# Patient Record
Sex: FEMALE | Race: WHITE | NOT HISPANIC OR LATINO | Employment: OTHER | URBAN - METROPOLITAN AREA
[De-identification: names, ages, dates, MRNs, and addresses within clinical notes are randomized per-mention and may not be internally consistent; named-entity substitution may affect disease eponyms.]

---

## 2023-06-05 LAB
ANION GAP IN SER/PLAS: 14 MMOL/L (ref 10–20)
CALCIUM (MG/DL) IN SER/PLAS: 9.4 MG/DL (ref 8.6–10.3)
CARBON DIOXIDE, TOTAL (MMOL/L) IN SER/PLAS: 24 MMOL/L (ref 21–32)
CHLORIDE (MMOL/L) IN SER/PLAS: 103 MMOL/L (ref 98–107)
CREATININE (MG/DL) IN SER/PLAS: 1.3 MG/DL (ref 0.5–1.05)
CREATININE (MG/DL) IN URINE: 55.8 MG/DL (ref 20–320)
ESTIMATED AVERAGE GLUCOSE FOR HBA1C: 169 MG/DL
GFR FEMALE: 41 ML/MIN/1.73M2
GLUCOSE (MG/DL) IN SER/PLAS: 251 MG/DL (ref 74–99)
HEMOGLOBIN A1C/HEMOGLOBIN TOTAL IN BLOOD: 7.5 %
PARATHYRIN INTACT (PG/ML) IN SER/PLAS: NORMAL
PHOSPHATE (MG/DL) IN SER/PLAS: 4.3 MG/DL (ref 2.5–4.9)
POTASSIUM (MMOL/L) IN SER/PLAS: 4.1 MMOL/L (ref 3.5–5.3)
PROTEIN (MG/DL) IN URINE: 5 MG/DL (ref 5–24)
PROTEIN/CREATININE (MG/MG) IN URINE: 0.09 MG/MG CREAT (ref 0–0.17)
SODIUM (MMOL/L) IN SER/PLAS: 137 MMOL/L (ref 136–145)
UREA NITROGEN (MG/DL) IN SER/PLAS: 28 MG/DL (ref 6–23)

## 2023-09-14 LAB
ANION GAP IN SER/PLAS: 16 MMOL/L (ref 10–20)
CALCIUM (MG/DL) IN SER/PLAS: 10.1 MG/DL (ref 8.6–10.3)
CARBON DIOXIDE, TOTAL (MMOL/L) IN SER/PLAS: 24 MMOL/L (ref 21–32)
CHLORIDE (MMOL/L) IN SER/PLAS: 103 MMOL/L (ref 98–107)
CREATININE (MG/DL) IN SER/PLAS: 1.58 MG/DL (ref 0.5–1.05)
ESTIMATED AVERAGE GLUCOSE FOR HBA1C: 157 MG/DL
GFR FEMALE: 33 ML/MIN/1.73M2
GLUCOSE (MG/DL) IN SER/PLAS: 199 MG/DL (ref 74–99)
HEMOGLOBIN A1C/HEMOGLOBIN TOTAL IN BLOOD: 7.1 %
POTASSIUM (MMOL/L) IN SER/PLAS: 3.9 MMOL/L (ref 3.5–5.3)
SODIUM (MMOL/L) IN SER/PLAS: 139 MMOL/L (ref 136–145)
UREA NITROGEN (MG/DL) IN SER/PLAS: 32 MG/DL (ref 6–23)

## 2023-11-25 DIAGNOSIS — E78.5 HYPERLIPIDEMIA, UNSPECIFIED HYPERLIPIDEMIA TYPE: ICD-10-CM

## 2023-11-25 DIAGNOSIS — I10 HYPERTENSION, UNSPECIFIED TYPE: ICD-10-CM

## 2023-11-25 DIAGNOSIS — N18.30 TYPE 2 DIABETES MELLITUS WITH STAGE 3 CHRONIC KIDNEY DISEASE, WITHOUT LONG-TERM CURRENT USE OF INSULIN, UNSPECIFIED WHETHER STAGE 3A OR 3B CKD (MULTI): Primary | ICD-10-CM

## 2023-11-25 DIAGNOSIS — E11.22 TYPE 2 DIABETES MELLITUS WITH STAGE 3 CHRONIC KIDNEY DISEASE, WITHOUT LONG-TERM CURRENT USE OF INSULIN, UNSPECIFIED WHETHER STAGE 3A OR 3B CKD (MULTI): Primary | ICD-10-CM

## 2023-11-27 RX ORDER — METFORMIN HYDROCHLORIDE 500 MG/1
500 TABLET ORAL 2 TIMES DAILY
Qty: 180 TABLET | Refills: 0 | Status: SHIPPED | OUTPATIENT
Start: 2023-11-27 | End: 2024-03-27 | Stop reason: SDUPTHER

## 2023-11-27 RX ORDER — SIMVASTATIN 40 MG/1
40 TABLET, FILM COATED ORAL NIGHTLY
Qty: 90 TABLET | Refills: 0 | Status: SHIPPED | OUTPATIENT
Start: 2023-11-27 | End: 2024-03-12 | Stop reason: SDUPTHER

## 2023-11-27 RX ORDER — LISINOPRIL 2.5 MG/1
2.5 TABLET ORAL DAILY
Qty: 90 TABLET | Refills: 0 | Status: SHIPPED | OUTPATIENT
Start: 2023-11-27 | End: 2024-03-12 | Stop reason: SDUPTHER

## 2023-11-27 RX ORDER — ATENOLOL 25 MG/1
TABLET ORAL DAILY
Qty: 45 TABLET | Refills: 0 | Status: SHIPPED | OUTPATIENT
Start: 2023-11-27 | End: 2023-12-21 | Stop reason: SINTOL

## 2023-12-11 ENCOUNTER — LAB (OUTPATIENT)
Dept: LAB | Facility: LAB | Age: 83
End: 2023-12-11
Payer: MEDICARE

## 2023-12-11 DIAGNOSIS — E11.69 TYPE 2 DIABETES MELLITUS WITH OTHER SPECIFIED COMPLICATION (MULTI): ICD-10-CM

## 2023-12-11 DIAGNOSIS — N25.81 SECONDARY HYPERPARATHYROIDISM OF RENAL ORIGIN (MULTI): ICD-10-CM

## 2023-12-11 DIAGNOSIS — E78.5 HYPERLIPIDEMIA, UNSPECIFIED: Primary | ICD-10-CM

## 2023-12-11 LAB
ALBUMIN SERPL BCP-MCNC: 4.3 G/DL (ref 3.4–5)
ALP SERPL-CCNC: 102 U/L (ref 33–136)
ALT SERPL W P-5'-P-CCNC: 16 U/L (ref 7–45)
ANION GAP SERPL CALC-SCNC: 16 MMOL/L (ref 10–20)
AST SERPL W P-5'-P-CCNC: 18 U/L (ref 9–39)
BASOPHILS # BLD AUTO: 0.06 X10*3/UL (ref 0–0.1)
BASOPHILS NFR BLD AUTO: 0.8 %
BILIRUB SERPL-MCNC: 0.6 MG/DL (ref 0–1.2)
BUN SERPL-MCNC: 29 MG/DL (ref 6–23)
CALCIUM SERPL-MCNC: 9.8 MG/DL (ref 8.6–10.3)
CHLORIDE SERPL-SCNC: 102 MMOL/L (ref 98–107)
CHOLEST SERPL-MCNC: 157 MG/DL (ref 0–199)
CHOLESTEROL/HDL RATIO: 3.9
CO2 SERPL-SCNC: 25 MMOL/L (ref 21–32)
CREAT SERPL-MCNC: 1.65 MG/DL (ref 0.5–1.05)
CREAT UR-MCNC: 65.3 MG/DL (ref 20–320)
EOSINOPHIL # BLD AUTO: 0.35 X10*3/UL (ref 0–0.4)
EOSINOPHIL NFR BLD AUTO: 4.5 %
ERYTHROCYTE [DISTWIDTH] IN BLOOD BY AUTOMATED COUNT: 14.2 % (ref 11.5–14.5)
GFR SERPL CREATININE-BSD FRML MDRD: 31 ML/MIN/1.73M*2
GLUCOSE SERPL-MCNC: 175 MG/DL (ref 74–99)
HCT VFR BLD AUTO: 50.6 % (ref 36–46)
HDLC SERPL-MCNC: 40.5 MG/DL
HGB BLD-MCNC: 16.1 G/DL (ref 12–16)
IMM GRANULOCYTES # BLD AUTO: 0.02 X10*3/UL (ref 0–0.5)
IMM GRANULOCYTES NFR BLD AUTO: 0.3 % (ref 0–0.9)
LDLC SERPL CALC-MCNC: 72 MG/DL
LYMPHOCYTES # BLD AUTO: 3.21 X10*3/UL (ref 0.8–3)
LYMPHOCYTES NFR BLD AUTO: 41.5 %
MCH RBC QN AUTO: 31.6 PG (ref 26–34)
MCHC RBC AUTO-ENTMCNC: 31.8 G/DL (ref 32–36)
MCV RBC AUTO: 99 FL (ref 80–100)
MONOCYTES # BLD AUTO: 0.52 X10*3/UL (ref 0.05–0.8)
MONOCYTES NFR BLD AUTO: 6.7 %
NEUTROPHILS # BLD AUTO: 3.58 X10*3/UL (ref 1.6–5.5)
NEUTROPHILS NFR BLD AUTO: 46.2 %
NON HDL CHOLESTEROL: 117 MG/DL (ref 0–149)
NRBC BLD-RTO: 0 /100 WBCS (ref 0–0)
PLATELET # BLD AUTO: 231 X10*3/UL (ref 150–450)
POTASSIUM SERPL-SCNC: 4.4 MMOL/L (ref 3.5–5.3)
PROT SERPL-MCNC: 6.7 G/DL (ref 6.4–8.2)
PROT UR-ACNC: 7 MG/DL (ref 5–24)
PROT/CREAT UR: 0.11 MG/MG CREAT (ref 0–0.17)
RBC # BLD AUTO: 5.09 X10*6/UL (ref 4–5.2)
SODIUM SERPL-SCNC: 139 MMOL/L (ref 136–145)
TRIGL SERPL-MCNC: 225 MG/DL (ref 0–149)
VLDL: 45 MG/DL (ref 0–40)
WBC # BLD AUTO: 7.7 X10*3/UL (ref 4.4–11.3)

## 2023-12-11 PROCEDURE — 36415 COLL VENOUS BLD VENIPUNCTURE: CPT

## 2023-12-11 PROCEDURE — 82570 ASSAY OF URINE CREATININE: CPT

## 2023-12-11 PROCEDURE — 85025 COMPLETE CBC W/AUTO DIFF WBC: CPT

## 2023-12-11 PROCEDURE — 80061 LIPID PANEL: CPT

## 2023-12-11 PROCEDURE — 84156 ASSAY OF PROTEIN URINE: CPT

## 2023-12-11 PROCEDURE — 80053 COMPREHEN METABOLIC PANEL: CPT

## 2023-12-12 ENCOUNTER — APPOINTMENT (OUTPATIENT)
Dept: RADIOLOGY | Facility: HOSPITAL | Age: 83
End: 2023-12-12
Payer: MEDICARE

## 2023-12-12 ENCOUNTER — HOSPITAL ENCOUNTER (OUTPATIENT)
Facility: HOSPITAL | Age: 83
Setting detail: OBSERVATION
Discharge: HOME | End: 2023-12-14
Attending: EMERGENCY MEDICINE | Admitting: STUDENT IN AN ORGANIZED HEALTH CARE EDUCATION/TRAINING PROGRAM
Payer: MEDICARE

## 2023-12-12 ENCOUNTER — TELEPHONE (OUTPATIENT)
Dept: PRIMARY CARE | Facility: CLINIC | Age: 83
End: 2023-12-12
Payer: MEDICARE

## 2023-12-12 DIAGNOSIS — I24.9 ACS (ACUTE CORONARY SYNDROME) (MULTI): Primary | ICD-10-CM

## 2023-12-12 DIAGNOSIS — R42 DIZZINESS: ICD-10-CM

## 2023-12-12 DIAGNOSIS — R07.9 CHEST PAIN, UNSPECIFIED TYPE: ICD-10-CM

## 2023-12-12 DIAGNOSIS — R00.1 BRADYCARDIA: ICD-10-CM

## 2023-12-12 DIAGNOSIS — K30 INDIGESTION: ICD-10-CM

## 2023-12-12 PROBLEM — R07.89 OTHER CHEST PAIN: Status: ACTIVE | Noted: 2023-12-12

## 2023-12-12 PROBLEM — H40.9 GLAUCOMA, BILATERAL: Status: ACTIVE | Noted: 2023-12-12

## 2023-12-12 PROBLEM — N18.30 CKD STAGE 3 DUE TO TYPE 2 DIABETES MELLITUS (MULTI): Status: ACTIVE | Noted: 2023-12-12

## 2023-12-12 PROBLEM — E11.69 DIABETES MELLITUS TYPE 2 IN OBESE: Status: ACTIVE | Noted: 2023-12-12

## 2023-12-12 PROBLEM — M51.36 DEGENERATION OF INTERVERTEBRAL DISC OF LUMBAR REGION: Status: ACTIVE | Noted: 2023-12-12

## 2023-12-12 PROBLEM — E66.9 DIABETES MELLITUS TYPE 2 IN OBESE: Status: ACTIVE | Noted: 2023-12-12

## 2023-12-12 PROBLEM — N25.81 HYPERPARATHYROIDISM, SECONDARY (MULTI): Status: ACTIVE | Noted: 2023-12-12

## 2023-12-12 PROBLEM — M51.369 DEGENERATION OF INTERVERTEBRAL DISC OF LUMBAR REGION: Status: ACTIVE | Noted: 2023-12-12

## 2023-12-12 PROBLEM — M47.812 DEGENERATIVE ARTHRITIS OF CERVICAL SPINE: Status: ACTIVE | Noted: 2023-12-12

## 2023-12-12 PROBLEM — I87.309 CHRONIC PERIPHERAL VENOUS HYPERTENSION: Status: ACTIVE | Noted: 2023-12-12

## 2023-12-12 PROBLEM — G89.29 CHRONIC RIGHT SHOULDER PAIN: Status: ACTIVE | Noted: 2021-03-22

## 2023-12-12 PROBLEM — E55.9 VITAMIN D DEFICIENCY: Status: ACTIVE | Noted: 2023-12-12

## 2023-12-12 PROBLEM — M25.511 CHRONIC RIGHT SHOULDER PAIN: Status: ACTIVE | Noted: 2021-03-22

## 2023-12-12 PROBLEM — E11.22 CKD STAGE 3 DUE TO TYPE 2 DIABETES MELLITUS (MULTI): Status: ACTIVE | Noted: 2023-12-12

## 2023-12-12 PROBLEM — H26.9 CATARACTS, BOTH EYES: Status: ACTIVE | Noted: 2023-12-12

## 2023-12-12 LAB
ALBUMIN SERPL BCP-MCNC: 3.9 G/DL (ref 3.4–5)
ALP SERPL-CCNC: 84 U/L (ref 33–136)
ALT SERPL W P-5'-P-CCNC: 16 U/L (ref 7–45)
ANION GAP SERPL CALC-SCNC: 12 MMOL/L (ref 10–20)
AST SERPL W P-5'-P-CCNC: 15 U/L (ref 9–39)
BASOPHILS # BLD AUTO: 0.05 X10*3/UL (ref 0–0.1)
BASOPHILS NFR BLD AUTO: 0.5 %
BILIRUB SERPL-MCNC: 0.6 MG/DL (ref 0–1.2)
BNP SERPL-MCNC: 149 PG/ML (ref 0–99)
BUN SERPL-MCNC: 31 MG/DL (ref 6–23)
CALCIUM SERPL-MCNC: 9.3 MG/DL (ref 8.6–10.3)
CARDIAC TROPONIN I PNL SERPL HS: 3 NG/L (ref 0–13)
CARDIAC TROPONIN I PNL SERPL HS: 4 NG/L (ref 0–13)
CHLORIDE SERPL-SCNC: 105 MMOL/L (ref 98–107)
CO2 SERPL-SCNC: 26 MMOL/L (ref 21–32)
CREAT SERPL-MCNC: 1.51 MG/DL (ref 0.5–1.05)
EOSINOPHIL # BLD AUTO: 0.37 X10*3/UL (ref 0–0.4)
EOSINOPHIL NFR BLD AUTO: 4 %
ERYTHROCYTE [DISTWIDTH] IN BLOOD BY AUTOMATED COUNT: 16.2 % (ref 11.5–14.5)
GFR SERPL CREATININE-BSD FRML MDRD: 35 ML/MIN/1.73M*2
GLUCOSE SERPL-MCNC: 158 MG/DL (ref 74–99)
HCT VFR BLD AUTO: 46.1 % (ref 36–46)
HGB BLD-MCNC: 14.5 G/DL (ref 12–16)
IMM GRANULOCYTES # BLD AUTO: 0.03 X10*3/UL (ref 0–0.5)
IMM GRANULOCYTES NFR BLD AUTO: 0.3 % (ref 0–0.9)
LIPASE SERPL-CCNC: 53 U/L (ref 9–82)
LYMPHOCYTES # BLD AUTO: 3.81 X10*3/UL (ref 0.8–3)
LYMPHOCYTES NFR BLD AUTO: 41.6 %
MAGNESIUM SERPL-MCNC: 1.99 MG/DL (ref 1.6–2.4)
MCH RBC QN AUTO: 30.7 PG (ref 26–34)
MCHC RBC AUTO-ENTMCNC: 31.5 G/DL (ref 32–36)
MCV RBC AUTO: 98 FL (ref 80–100)
MONOCYTES # BLD AUTO: 0.74 X10*3/UL (ref 0.05–0.8)
MONOCYTES NFR BLD AUTO: 8.1 %
NEUTROPHILS # BLD AUTO: 4.15 X10*3/UL (ref 1.6–5.5)
NEUTROPHILS NFR BLD AUTO: 45.5 %
NRBC BLD-RTO: 0 /100 WBCS (ref 0–0)
PLATELET # BLD AUTO: 211 X10*3/UL (ref 150–450)
POTASSIUM SERPL-SCNC: 3.9 MMOL/L (ref 3.5–5.3)
PROT SERPL-MCNC: 6.4 G/DL (ref 6.4–8.2)
RBC # BLD AUTO: 4.72 X10*6/UL (ref 4–5.2)
SODIUM SERPL-SCNC: 139 MMOL/L (ref 136–145)
WBC # BLD AUTO: 9.2 X10*3/UL (ref 4.4–11.3)

## 2023-12-12 PROCEDURE — 99222 1ST HOSP IP/OBS MODERATE 55: CPT | Performed by: NURSE PRACTITIONER

## 2023-12-12 PROCEDURE — 99285 EMERGENCY DEPT VISIT HI MDM: CPT | Mod: 25 | Performed by: EMERGENCY MEDICINE

## 2023-12-12 PROCEDURE — C9113 INJ PANTOPRAZOLE SODIUM, VIA: HCPCS | Performed by: NURSE PRACTITIONER

## 2023-12-12 PROCEDURE — 85025 COMPLETE CBC W/AUTO DIFF WBC: CPT | Performed by: EMERGENCY MEDICINE

## 2023-12-12 PROCEDURE — 83880 ASSAY OF NATRIURETIC PEPTIDE: CPT | Performed by: EMERGENCY MEDICINE

## 2023-12-12 PROCEDURE — 2500000004 HC RX 250 GENERAL PHARMACY W/ HCPCS (ALT 636 FOR OP/ED): Mod: MUE | Performed by: NURSE PRACTITIONER

## 2023-12-12 PROCEDURE — 71045 X-RAY EXAM CHEST 1 VIEW: CPT

## 2023-12-12 PROCEDURE — 2500000001 HC RX 250 WO HCPCS SELF ADMINISTERED DRUGS (ALT 637 FOR MEDICARE OP): Performed by: NURSE PRACTITIONER

## 2023-12-12 PROCEDURE — 96375 TX/PRO/DX INJ NEW DRUG ADDON: CPT

## 2023-12-12 PROCEDURE — 71045 X-RAY EXAM CHEST 1 VIEW: CPT | Performed by: RADIOLOGY

## 2023-12-12 PROCEDURE — 94760 N-INVAS EAR/PLS OXIMETRY 1: CPT

## 2023-12-12 PROCEDURE — 99285 EMERGENCY DEPT VISIT HI MDM: CPT | Performed by: EMERGENCY MEDICINE

## 2023-12-12 PROCEDURE — 83735 ASSAY OF MAGNESIUM: CPT | Performed by: EMERGENCY MEDICINE

## 2023-12-12 PROCEDURE — 93010 ELECTROCARDIOGRAM REPORT: CPT | Performed by: INTERNAL MEDICINE

## 2023-12-12 PROCEDURE — 36415 COLL VENOUS BLD VENIPUNCTURE: CPT | Performed by: EMERGENCY MEDICINE

## 2023-12-12 PROCEDURE — 84484 ASSAY OF TROPONIN QUANT: CPT | Performed by: EMERGENCY MEDICINE

## 2023-12-12 PROCEDURE — 83690 ASSAY OF LIPASE: CPT | Performed by: EMERGENCY MEDICINE

## 2023-12-12 PROCEDURE — 80053 COMPREHEN METABOLIC PANEL: CPT | Performed by: EMERGENCY MEDICINE

## 2023-12-12 PROCEDURE — G0378 HOSPITAL OBSERVATION PER HR: HCPCS

## 2023-12-12 PROCEDURE — 93010 ELECTROCARDIOGRAM REPORT: CPT | Performed by: EMERGENCY MEDICINE

## 2023-12-12 PROCEDURE — 2500000004 HC RX 250 GENERAL PHARMACY W/ HCPCS (ALT 636 FOR OP/ED)

## 2023-12-12 PROCEDURE — 96374 THER/PROPH/DIAG INJ IV PUSH: CPT

## 2023-12-12 PROCEDURE — 96372 THER/PROPH/DIAG INJ SC/IM: CPT | Mod: 59 | Performed by: NURSE PRACTITIONER

## 2023-12-12 RX ORDER — ACETAMINOPHEN 650 MG/1
650 SUPPOSITORY RECTAL EVERY 4 HOURS PRN
Status: DISCONTINUED | OUTPATIENT
Start: 2023-12-12 | End: 2023-12-14 | Stop reason: HOSPADM

## 2023-12-12 RX ORDER — BUMETANIDE 1 MG/1
0.5 TABLET ORAL DAILY
Status: DISCONTINUED | OUTPATIENT
Start: 2023-12-13 | End: 2023-12-14 | Stop reason: HOSPADM

## 2023-12-12 RX ORDER — SIMVASTATIN 40 MG/1
40 TABLET, FILM COATED ORAL NIGHTLY
Status: DISCONTINUED | OUTPATIENT
Start: 2023-12-12 | End: 2023-12-14 | Stop reason: HOSPADM

## 2023-12-12 RX ORDER — TIMOLOL 5 MG/ML
1 SOLUTION/ DROPS OPHTHALMIC DAILY
Status: DISCONTINUED | OUTPATIENT
Start: 2023-12-13 | End: 2023-12-14 | Stop reason: ALTCHOICE

## 2023-12-12 RX ORDER — NITROGLYCERIN 0.4 MG/1
0.4 TABLET SUBLINGUAL ONCE
Status: DISCONTINUED | OUTPATIENT
Start: 2023-12-12 | End: 2023-12-14 | Stop reason: HOSPADM

## 2023-12-12 RX ORDER — DEXTROSE 50 % IN WATER (D50W) INTRAVENOUS SYRINGE
25
Status: DISCONTINUED | OUTPATIENT
Start: 2023-12-12 | End: 2023-12-14 | Stop reason: HOSPADM

## 2023-12-12 RX ORDER — DEXTROSE MONOHYDRATE 100 MG/ML
0.3 INJECTION, SOLUTION INTRAVENOUS ONCE AS NEEDED
Status: DISCONTINUED | OUTPATIENT
Start: 2023-12-12 | End: 2023-12-14 | Stop reason: HOSPADM

## 2023-12-12 RX ORDER — CALCITRIOL 0.25 UG/1
0.25 CAPSULE ORAL
COMMUNITY
Start: 2017-06-19 | End: 2024-03-27 | Stop reason: SDUPTHER

## 2023-12-12 RX ORDER — HEPARIN SODIUM 5000 [USP'U]/ML
5000 INJECTION, SOLUTION INTRAVENOUS; SUBCUTANEOUS EVERY 8 HOURS
Status: DISCONTINUED | OUTPATIENT
Start: 2023-12-12 | End: 2023-12-14 | Stop reason: HOSPADM

## 2023-12-12 RX ORDER — ACETAMINOPHEN 325 MG/1
650 TABLET ORAL EVERY 4 HOURS PRN
Status: DISCONTINUED | OUTPATIENT
Start: 2023-12-12 | End: 2023-12-14 | Stop reason: HOSPADM

## 2023-12-12 RX ORDER — LATANOPROST 50 UG/ML
1 SOLUTION/ DROPS OPHTHALMIC NIGHTLY
Status: DISCONTINUED | OUTPATIENT
Start: 2023-12-12 | End: 2023-12-14 | Stop reason: HOSPADM

## 2023-12-12 RX ORDER — ONDANSETRON 4 MG/1
4 TABLET, ORALLY DISINTEGRATING ORAL EVERY 8 HOURS PRN
Status: DISCONTINUED | OUTPATIENT
Start: 2023-12-12 | End: 2023-12-14 | Stop reason: HOSPADM

## 2023-12-12 RX ORDER — ALLOPURINOL 300 MG/1
300 TABLET ORAL DAILY
COMMUNITY
Start: 2021-09-23 | End: 2024-03-27 | Stop reason: SDUPTHER

## 2023-12-12 RX ORDER — ACETAMINOPHEN 160 MG/5ML
650 SOLUTION ORAL EVERY 4 HOURS PRN
Status: DISCONTINUED | OUTPATIENT
Start: 2023-12-12 | End: 2023-12-14 | Stop reason: HOSPADM

## 2023-12-12 RX ORDER — POLYETHYLENE GLYCOL 3350 17 G/17G
17 POWDER, FOR SOLUTION ORAL DAILY PRN
Status: DISCONTINUED | OUTPATIENT
Start: 2023-12-12 | End: 2023-12-14 | Stop reason: HOSPADM

## 2023-12-12 RX ORDER — CALCITRIOL 0.25 UG/1
0.25 CAPSULE ORAL DAILY
Status: DISCONTINUED | OUTPATIENT
Start: 2023-12-13 | End: 2023-12-14 | Stop reason: HOSPADM

## 2023-12-12 RX ORDER — PANTOPRAZOLE SODIUM 40 MG/10ML
40 INJECTION, POWDER, LYOPHILIZED, FOR SOLUTION INTRAVENOUS DAILY
Status: DISCONTINUED | OUTPATIENT
Start: 2023-12-12 | End: 2023-12-14 | Stop reason: HOSPADM

## 2023-12-12 RX ORDER — ONDANSETRON HYDROCHLORIDE 2 MG/ML
4 INJECTION, SOLUTION INTRAVENOUS ONCE
Status: COMPLETED | OUTPATIENT
Start: 2023-12-12 | End: 2023-12-12

## 2023-12-12 RX ORDER — LATANOPROST 50 UG/ML
1 SOLUTION/ DROPS OPHTHALMIC NIGHTLY
COMMUNITY
Start: 2021-09-05

## 2023-12-12 RX ORDER — ATENOLOL 25 MG/1
12.5 TABLET ORAL DAILY
Status: DISCONTINUED | OUTPATIENT
Start: 2023-12-13 | End: 2023-12-14

## 2023-12-12 RX ORDER — CALCIUM CARBONATE 200(500)MG
500 TABLET,CHEWABLE ORAL 2 TIMES DAILY
Status: DISCONTINUED | OUTPATIENT
Start: 2023-12-12 | End: 2023-12-14 | Stop reason: HOSPADM

## 2023-12-12 RX ORDER — TIMOLOL 5 MG/ML
1 SOLUTION/ DROPS OPHTHALMIC 2 TIMES DAILY
COMMUNITY

## 2023-12-12 RX ORDER — ONDANSETRON HYDROCHLORIDE 2 MG/ML
4 INJECTION, SOLUTION INTRAVENOUS EVERY 8 HOURS PRN
Status: DISCONTINUED | OUTPATIENT
Start: 2023-12-12 | End: 2023-12-14 | Stop reason: HOSPADM

## 2023-12-12 RX ORDER — LISINOPRIL 5 MG/1
2.5 TABLET ORAL DAILY
Status: DISCONTINUED | OUTPATIENT
Start: 2023-12-13 | End: 2023-12-14 | Stop reason: HOSPADM

## 2023-12-12 RX ORDER — BUMETANIDE 0.5 MG/1
0.5 TABLET ORAL DAILY
COMMUNITY
Start: 2016-09-20

## 2023-12-12 RX ORDER — VIT A/VIT C/VIT E/ZINC/COPPER 4296-226
1 CAPSULE ORAL EVERY 12 HOURS
COMMUNITY

## 2023-12-12 RX ORDER — ALLOPURINOL 300 MG/1
300 TABLET ORAL DAILY
Status: DISCONTINUED | OUTPATIENT
Start: 2023-12-13 | End: 2023-12-14 | Stop reason: HOSPADM

## 2023-12-12 RX ORDER — INSULIN LISPRO 100 [IU]/ML
0-10 INJECTION, SOLUTION INTRAVENOUS; SUBCUTANEOUS
Status: DISCONTINUED | OUTPATIENT
Start: 2023-12-13 | End: 2023-12-14 | Stop reason: HOSPADM

## 2023-12-12 RX ADMIN — ONDANSETRON 4 MG: 2 INJECTION INTRAMUSCULAR; INTRAVENOUS at 11:36

## 2023-12-12 RX ADMIN — SIMVASTATIN 40 MG: 40 TABLET, FILM COATED ORAL at 21:21

## 2023-12-12 RX ADMIN — CALCIUM CARBONATE (ANTACID) CHEW TAB 500 MG 500 MG: 500 CHEW TAB at 21:21

## 2023-12-12 RX ADMIN — PANTOPRAZOLE SODIUM 40 MG: 40 INJECTION, POWDER, FOR SOLUTION INTRAVENOUS at 21:21

## 2023-12-12 RX ADMIN — LATANOPROST 1 DROP: 50 SOLUTION/ DROPS OPHTHALMIC at 22:30

## 2023-12-12 RX ADMIN — SODIUM CHLORIDE, POTASSIUM CHLORIDE, SODIUM LACTATE AND CALCIUM CHLORIDE 1000 ML: 600; 310; 30; 20 INJECTION, SOLUTION INTRAVENOUS at 11:41

## 2023-12-12 RX ADMIN — HEPARIN SODIUM 5000 UNITS: 5000 INJECTION INTRAVENOUS; SUBCUTANEOUS at 21:22

## 2023-12-12 SDOH — SOCIAL STABILITY: SOCIAL INSECURITY: HAS ANYONE EVER THREATENED TO HURT YOUR FAMILY OR YOUR PETS?: NO

## 2023-12-12 SDOH — SOCIAL STABILITY: SOCIAL INSECURITY: DO YOU FEEL UNSAFE GOING BACK TO THE PLACE WHERE YOU ARE LIVING?: NO

## 2023-12-12 SDOH — SOCIAL STABILITY: SOCIAL INSECURITY: HAVE YOU HAD THOUGHTS OF HARMING ANYONE ELSE?: NO

## 2023-12-12 SDOH — SOCIAL STABILITY: SOCIAL INSECURITY: WERE YOU ABLE TO COMPLETE ALL THE BEHAVIORAL HEALTH SCREENINGS?: YES

## 2023-12-12 SDOH — SOCIAL STABILITY: SOCIAL INSECURITY: ABUSE: ADULT

## 2023-12-12 SDOH — SOCIAL STABILITY: SOCIAL INSECURITY: DO YOU FEEL ANYONE HAS EXPLOITED OR TAKEN ADVANTAGE OF YOU FINANCIALLY OR OF YOUR PERSONAL PROPERTY?: NO

## 2023-12-12 SDOH — SOCIAL STABILITY: SOCIAL INSECURITY: ARE YOU OR HAVE YOU BEEN THREATENED OR ABUSED PHYSICALLY, EMOTIONALLY, OR SEXUALLY BY ANYONE?: NO

## 2023-12-12 SDOH — SOCIAL STABILITY: SOCIAL INSECURITY: DOES ANYONE TRY TO KEEP YOU FROM HAVING/CONTACTING OTHER FRIENDS OR DOING THINGS OUTSIDE YOUR HOME?: NO

## 2023-12-12 SDOH — SOCIAL STABILITY: SOCIAL INSECURITY: ARE THERE ANY APPARENT SIGNS OF INJURIES/BEHAVIORS THAT COULD BE RELATED TO ABUSE/NEGLECT?: NO

## 2023-12-12 ASSESSMENT — ACTIVITIES OF DAILY LIVING (ADL)
LACK_OF_TRANSPORTATION: NO
ADEQUATE_TO_COMPLETE_ADL: YES
JUDGMENT_ADEQUATE_SAFELY_COMPLETE_DAILY_ACTIVITIES: YES
HEARING - RIGHT EAR: DIFFICULTY WITH NOISE
FEEDING YOURSELF: INDEPENDENT
BATHING: INDEPENDENT
GROOMING: INDEPENDENT
WALKS IN HOME: NEEDS ASSISTANCE
DRESSING YOURSELF: INDEPENDENT
TOILETING: INDEPENDENT
HEARING - LEFT EAR: FUNCTIONAL
PATIENT'S MEMORY ADEQUATE TO SAFELY COMPLETE DAILY ACTIVITIES?: YES

## 2023-12-12 ASSESSMENT — LIFESTYLE VARIABLES
SKIP TO QUESTIONS 9-10: 1
EVER FELT BAD OR GUILTY ABOUT YOUR DRINKING: NO
REASON UNABLE TO ASSESS: NO
HAVE YOU EVER FELT YOU SHOULD CUT DOWN ON YOUR DRINKING: NO
HOW MANY STANDARD DRINKS CONTAINING ALCOHOL DO YOU HAVE ON A TYPICAL DAY: 1 OR 2
HOW OFTEN DO YOU HAVE 6 OR MORE DRINKS ON ONE OCCASION: NEVER
AUDIT-C TOTAL SCORE: 1
PRESCIPTION_ABUSE_PAST_12_MONTHS: NO
AUDIT-C TOTAL SCORE: 1
SUBSTANCE_ABUSE_PAST_12_MONTHS: NO
HAVE PEOPLE ANNOYED YOU BY CRITICIZING YOUR DRINKING: NO
EVER HAD A DRINK FIRST THING IN THE MORNING TO STEADY YOUR NERVES TO GET RID OF A HANGOVER: NO
HOW OFTEN DO YOU HAVE A DRINK CONTAINING ALCOHOL: MONTHLY OR LESS

## 2023-12-12 ASSESSMENT — ENCOUNTER SYMPTOMS
WHEEZING: 0
UNEXPECTED WEIGHT CHANGE: 0
CONSTIPATION: 0
COUGH: 0
FEVER: 0
DIAPHORESIS: 0
CHILLS: 0
PALPITATIONS: 0
ABDOMINAL PAIN: 0
MUSCULOSKELETAL NEGATIVE: 1
RESPIRATORY NEGATIVE: 1
APPETITE CHANGE: 0
CHEST TIGHTNESS: 0
ACTIVITY CHANGE: 0
NEUROLOGICAL NEGATIVE: 1
PSYCHIATRIC NEGATIVE: 1
ABDOMINAL DISTENTION: 0
SHORTNESS OF BREATH: 0
CHOKING: 0
FATIGUE: 0
NAUSEA: 1
EYES NEGATIVE: 1
DIARRHEA: 0
VOMITING: 0
STRIDOR: 0

## 2023-12-12 ASSESSMENT — PATIENT HEALTH QUESTIONNAIRE - PHQ9
1. LITTLE INTEREST OR PLEASURE IN DOING THINGS: NOT AT ALL
2. FEELING DOWN, DEPRESSED OR HOPELESS: NOT AT ALL
SUM OF ALL RESPONSES TO PHQ9 QUESTIONS 1 & 2: 0

## 2023-12-12 ASSESSMENT — PAIN - FUNCTIONAL ASSESSMENT
PAIN_FUNCTIONAL_ASSESSMENT: 0-10
PAIN_FUNCTIONAL_ASSESSMENT: 0-10

## 2023-12-12 ASSESSMENT — COLUMBIA-SUICIDE SEVERITY RATING SCALE - C-SSRS
2. HAVE YOU ACTUALLY HAD ANY THOUGHTS OF KILLING YOURSELF?: NO
1. IN THE PAST MONTH, HAVE YOU WISHED YOU WERE DEAD OR WISHED YOU COULD GO TO SLEEP AND NOT WAKE UP?: NO
6. HAVE YOU EVER DONE ANYTHING, STARTED TO DO ANYTHING, OR PREPARED TO DO ANYTHING TO END YOUR LIFE?: NO

## 2023-12-12 ASSESSMENT — COGNITIVE AND FUNCTIONAL STATUS - GENERAL
MOVING TO AND FROM BED TO CHAIR: A LITTLE
DAILY ACTIVITIY SCORE: 20
DRESSING REGULAR UPPER BODY CLOTHING: A LITTLE
TOILETING: A LITTLE
MOBILITY SCORE: 18
WALKING IN HOSPITAL ROOM: A LOT
PERSONAL GROOMING: A LITTLE
CLIMB 3 TO 5 STEPS WITH RAILING: A LOT
STANDING UP FROM CHAIR USING ARMS: A LITTLE
PATIENT BASELINE BEDBOUND: NO
DRESSING REGULAR LOWER BODY CLOTHING: A LITTLE

## 2023-12-12 ASSESSMENT — PAIN SCALES - GENERAL: PAINLEVEL_OUTOF10: 0 - NO PAIN

## 2023-12-12 NOTE — H&P
History Of Present Illness  Lakshmi Trinh is a 82 y.o. female with history HTN, CKD, diabetes, HLD, Ménière's disease presents to [facility] ER for central chest pain, belching, dizziness, nausea.  Patient reports that she had Ménière's disease related dizziness on Friday but was asymptomatic.  Patient reports that last night she was having some feelings of chest fullness after meals.  Patient reports that this morning she started having belching symptoms, dizziness that did not feel like her Ménière's disease, upper gastric lower non radiating chest pain.  Patient denies associated fever, chills, vomiting, coughing, respiratory symptoms, abdominal pain, difficulty urinating, diarrhea, shortness of breath, or weakness.  Patient denies any radiation of chest pain to arm or neck. While in ED, patient was able to point to the location of her chest heaviness/pressure and with palpation was able to reproduce the pain, she then started to belch multiple times, indicating she wanted to eat. Patient asked if the chest pressure has resolved, she states that after receiving IV fluids, it took nearly all the discomfort away. Patient to be admitted for ACS rule out.      Past Medical History  Active Problems  Problems    · Adhesive capsulitis of right shoulder (726.0) (M75.01)   · Back muscle spasm (724.8) (M62.830)   · Bradycardia (427.89) (R00.1)   · Cataracts, both eyes (366.9) (H26.9)   · Chronic venous hypertension with complication involving left side (459.39) (I87.392)   · Chronic venous hypertension with complication involving right side (459.39) (I87.391)   · CKD (chronic kidney disease) (585.9) (N18.9)   · CKD stage 3 due to type 2 diabetes mellitus (250.40,585.3) (E11.22,N18.30)   · Class 1 obesity with body mass index (BMI) of 34.0 to 34.9 in adult (278.00,V85.34)  (E66.9,Z68.34)   · Degeneration of intervertebral disc of lumbar region (722.52) (M51.36)   · Degenerative arthritis of cervical spine (721.0)  (M47.812)   · Degenerative arthritis of metacarpophalangeal joint of index finger of right hand (715.94)  (M19.041)   · Dependent edema (782.3) (R60.9)   · Diabetes mellitus type 2 in obese (250.00,278.00) (E11.69,E66.9)   · Encounter for screening mammogram for malignant neoplasm of breast (V76.12)  (Z12.31)   · Essential hypertension (401.9) (I10)   · Excessive cerumen in right ear canal (380.4) (H61.21)   · Former smoker (V15.82) (Z87.891)   · Glaucoma, bilateral (365.9) (H40.9)   · Gout (274.9) (M10.9)   · Hyperlipidemia (272.4) (E78.5)   · Hyperparathyroidism, secondary (588.81) (N25.81)   · Hyperparathyroidism, secondary   · Hyperplastic colon polyp (211.3) (K63.5)   · Hyperuricemia (790.6) (E79.0)   · Labial lesion (624.8) (N90.89)   · Leg pain (729.5) (M79.606)   · Meniere disease (386.00) (H81.09)   · Muscle spasm (728.85) (M62.838)   · Nail abnormality (703.9) (L60.9)   · Nocturnal enuresis (788.36) (N39.44)   · Numbness and tingling of left hand (782.0) (R20.0,R20.2)   · Osteoarthrosis, hip (715.35) (M16.9)   · left   · Osteopenia (733.90) (M85.80)   · Other low back pain (724.2) (M54.59)   · Other specified urinary incontinence (788.39) (N39.498)   · Postoperative hematoma involving circulatory system following circulatory system  procedure (998.12) (I97.638)   · PVD (peripheral vascular disease) (443.9) (I73.9)   · PVD (peripheral vascular disease)   · History of Superficial phlebitis and thrombophlebitis of right lower extremity (451.0)  (I80.01)   · Superficial vein thrombosis (453.89) (I82.890)   · Tongue irritation (529.9) (K14.9)   · Urge and stress incontinence (788.33) (N39.46)   · Urgency incontinence (788.31) (N39.41)   · URI with cough and congestion (465.9) (J06.9)   · Varicose veins of left lower extremity with inflammation (454.1) (I83.12)   · Varicose veins of legs (454.9) (I83.93)   · Varicose veins of right lower extremity with inflammation (454.1) (I83.11)   · Vitamin D deficiency  (268.9) (E55.9)     Past Medical History  Problems    · History of Class 2 severe obesity with serious comorbidity and body mass index (BMI) of  35.0 to 35.9 in adult (278.01,V85.35) (E66.01,Z68.35)   · Resolved Date: 2022   · History of Colon cancer screening (V76.51) (Z12.11)   · Resolved Date: 2022   · History of Diabetic frozen shoulder associated with type 2 diabetes mellitus  (250.80,726.0) (E11.618,M75.00)   · Resolved Date: 2023   · History of uterine leiomyoma (V13.29) (Z86.018)   · History of Preop examination (V72.84) (Z01.818)   · Resolved Date: 2022   · History of Skin cancer screening (V76.43) (Z12.83)   · Resolved Date: 2022   · History of Superficial phlebitis and thrombophlebitis of right lower extremity (451.0)  (I80.01)   · Resolved Date: 31 Mar 2023      Surgical History  Problems    · History of Complete colonoscopy   · 2019-repeat in 5 years   · History of Esophagogastroduodenoscopy   · History of Hip surgery   · History of Hysterectomy   · History of Knee surgery        Social History  Problems    · Caffeine use (V49.89) (Z78.9)   · Former smoker (V15.82) (Z87.891)   · No illicit drug use   · Retired from employment   · Social alcohol use (V49.89) (Z78.9)    Family History  Mother    · Family history of    · Family history of cerebrovascular accident (CVA) (V17.1) (Z82.3)   · Family history of diabetes mellitus (V18.0) (Z83.3)   · Family history of hypertension (V17.49) (Z82.49)   · Family history of malignant neoplasm of breast (V16.3) (Z80.3)   · FH: CVA (cerebrovascular accident) (V17.1) (Z82.3)  Father    · Family history of    · Family history of Stroke syndrome  Maternal Grandmother    · Family history of diabetes mellitus (V18.0) (Z83.3)   · Family history of hypertension (V17.49) (Z82.49)        Allergies  Farxiga [dapagliflozin] and Nsaids (non-steroidal anti-inflammatory drug)    Review of Systems   Constitutional:  Negative for  "activity change, appetite change, chills, diaphoresis, fatigue, fever and unexpected weight change.   HENT: Negative.     Eyes: Negative.    Respiratory: Negative.  Negative for cough, choking, chest tightness, shortness of breath, wheezing and stridor.    Cardiovascular:  Positive for chest pain. Negative for palpitations and leg swelling.   Gastrointestinal:  Positive for nausea. Negative for abdominal distention, abdominal pain, constipation, diarrhea and vomiting.   Genitourinary: Negative.    Musculoskeletal: Negative.    Neurological: Negative.    Psychiatric/Behavioral: Negative.        ROS: 12 systems reviewed and negative except per HPI above     Physical Exam by System:     Constitutional: Well developed, awake/alert/oriented x3, no distress, alert and cooperative   ENMT: mucous membranes moist   Head/Neck: Neck supple   Respiratory/Thorax: Patent airways, CTAB, normal breath sounds with good chest expansion, thorax symmetric   Cardiovascular: Bradycardia in 50-60's, no murmurs, 2+ equal pulses of the extremities, normal S 1and S 2   Gastrointestinal: Nondistended, soft, non-tender, no rebound tenderness or guarding, no masses palpable, no organomegaly, +BS, no bruits   Musculoskeletal: ROM intact, no joint swelling, normal strength   Extremities: normal extremities, no cyanosis edema, contusions or wounds, no clubbing   Neurological: alert and oriented x3, intact senses, motor, response and reflexes, normal strength       Last Recorded Vitals  Blood pressure (!) 139/93, pulse 60, temperature 36.4 °C (97.5 °F), temperature source Temporal, resp. rate 24, height 1.626 m (5' 4\"), weight 90.7 kg (200 lb), SpO2 98 %.    Relevant Results  Results for orders placed or performed during the hospital encounter of 12/12/23 (from the past 24 hour(s))   CBC and Auto Differential   Result Value Ref Range    WBC 9.2 4.4 - 11.3 x10*3/uL    nRBC 0.0 0.0 - 0.0 /100 WBCs    RBC 4.72 4.00 - 5.20 x10*6/uL    Hemoglobin 14.5 " 12.0 - 16.0 g/dL    Hematocrit 46.1 (H) 36.0 - 46.0 %    MCV 98 80 - 100 fL    MCH 30.7 26.0 - 34.0 pg    MCHC 31.5 (L) 32.0 - 36.0 g/dL    RDW 16.2 (H) 11.5 - 14.5 %    Platelets 211 150 - 450 x10*3/uL    Neutrophils % 45.5 40.0 - 80.0 %    Immature Granulocytes %, Automated 0.3 0.0 - 0.9 %    Lymphocytes % 41.6 13.0 - 44.0 %    Monocytes % 8.1 2.0 - 10.0 %    Eosinophils % 4.0 0.0 - 6.0 %    Basophils % 0.5 0.0 - 2.0 %    Neutrophils Absolute 4.15 1.60 - 5.50 x10*3/uL    Immature Granulocytes Absolute, Automated 0.03 0.00 - 0.50 x10*3/uL    Lymphocytes Absolute 3.81 (H) 0.80 - 3.00 x10*3/uL    Monocytes Absolute 0.74 0.05 - 0.80 x10*3/uL    Eosinophils Absolute 0.37 0.00 - 0.40 x10*3/uL    Basophils Absolute 0.05 0.00 - 0.10 x10*3/uL   B-type natriuretic peptide   Result Value Ref Range     (H) 0 - 99 pg/mL   Troponin I, High Sensitivity, Initial   Result Value Ref Range    Troponin I, High Sensitivity 3 0 - 13 ng/L   Troponin, High Sensitivity, 1 Hour   Result Value Ref Range    Troponin I, High Sensitivity 4 0 - 13 ng/L   Lipase   Result Value Ref Range    Lipase 53 9 - 82 U/L   Magnesium   Result Value Ref Range    Magnesium 1.99 1.60 - 2.40 mg/dL   Comprehensive metabolic panel   Result Value Ref Range    Glucose 158 (H) 74 - 99 mg/dL    Sodium 139 136 - 145 mmol/L    Potassium 3.9 3.5 - 5.3 mmol/L    Chloride 105 98 - 107 mmol/L    Bicarbonate 26 21 - 32 mmol/L    Anion Gap 12 10 - 20 mmol/L    Urea Nitrogen 31 (H) 6 - 23 mg/dL    Creatinine 1.51 (H) 0.50 - 1.05 mg/dL    eGFR 35 (L) >60 mL/min/1.73m*2    Calcium 9.3 8.6 - 10.3 mg/dL    Albumin 3.9 3.4 - 5.0 g/dL    Alkaline Phosphatase 84 33 - 136 U/L    Total Protein 6.4 6.4 - 8.2 g/dL    AST 15 9 - 39 U/L    Bilirubin, Total 0.6 0.0 - 1.2 mg/dL    ALT 16 7 - 45 U/L      Scheduled medications  nitroglycerin, 0.4 mg, sublingual, Once      Continuous medications     PRN medications       XR chest 1 view    Result Date: 12/12/2023  Interpreted By:   Thai Julien, STUDY: XR CHEST 1 VIEW;  12/12/2023 11:38 am   INDICATION: Signs/Symptoms:chest pain.   COMPARISON: None.   ACCESSION NUMBER(S): [id number]   ORDERING CLINICIAN: SHELL CADET   FINDINGS:   The cardiac silhouette is unremarkable. Costophrenic angles are sharp. Lungs are clear. The trachea is midline. There is no pneumothorax. Advanced degenerative changes of the right and moderate-to-severe degenerative changes of the left glenohumeral joints are seen. There are mild-to-moderate degenerative changes of the bilateral acromioclavicular joints.       1.  No active cardiopulmonary process.     Signed by: Thai Julien 12/12/2023 11:46 AM Dictation workstation:   [id number]     Assessment/Plan   Active Problems:    Other chest pain    Bradycardia    Chronic peripheral venous hypertension    CKD stage 3 due to type 2 diabetes mellitus (CMS/HCC)    Chronic right shoulder pain    Essential hypertension    Diabetes mellitus type 2 in obese (CMS/HCC)  GERD/Indigestion    Plan:    Admit to observation with tele  Troponin 3, delta troponin 4  Echo ordered  Hx CKD Cr 1.53  Diabetic diet  POC glucose monitoring AC HS  Insulin sliding scale  Nitroglycerin SL PRN for chest pain  Protonix 40 mg daily, add TUMS  Maintain potassium > 4.0, magnesium > 2.0  CXR no active cardiopulmonary disease  Resume patient medications  Am labs including bmp, mag  Dispo: require overnight stay to property treat and diagnose, likely DC tomorrow   POC discussed with patient and attending    Code status: Discussed with a patient at bedside, patient is a FULL CODE      I spent >50 minutes in the professional and overall care of this patient.    SIGNATURE: CIRO Jorgensen PATIENT NAME: Lakshmi Trinh   DATE: December 12, 2023 MRN: [id number]   TIME: 3:53 PM    Callie James CNP  [facility]  [address] [address]  Phone: [phone] Fax:  [phone]

## 2023-12-12 NOTE — TELEPHONE ENCOUNTER
----- Message from Misty Rincon MD sent at 12/11/2023  5:51 PM EST -----  Keep appointment to review results.  Slight decline in renal function.  Hemoglobin hematocrit are above normal

## 2023-12-12 NOTE — TELEPHONE ENCOUNTER
Telephone call to patient, discussed labs, he has an appointment tomorrow 12/13/23 will discuss more with her then.

## 2023-12-12 NOTE — ED PROVIDER NOTES
HPI   Chief Complaint   Patient presents with    Chest Pain     Heaviness with belching       Patient is a 82-year-old female history HTN, CKD, diabetes, HLD, Ménière's disease presenting to [facility] ED for central chest pain, belching, dizziness, nausea.  Patient reports that she had Ménière's disease related dizziness on Friday but was asymptomatic over the weekend on Monday.  Patient reports that last night she was having some feelings of chest fullness after meals.  Patient reports that this morning she started having belching symptoms, dizziness that did not feel like her Ménière's disease, chest pain.  Patient denies associated fever, chills, vomiting, coughing, respiratory symptoms, abdominal pain, difficulty urinating, diarrhea, shortness of breath, or weakness.  Patient denies any radiation of chest pain to arm or neck.  Family medical history notable for heart attack in her father.    Social history: Patient drinks occasionally, does not smoke                          No data recorded                Patient History   Past Medical History:   Diagnosis Date    Encounter for other preprocedural examination     Preop examination    Encounter for screening for malignant neoplasm of colon     Colon cancer screening    Encounter for screening for malignant neoplasm of skin     Skin cancer screening    Morbid (severe) obesity due to excess calories (CMS/[facility]) 04/01/2022    Class 2 severe obesity with serious comorbidity and body mass index (BMI) of 35.0 to 35.9 in adult    Personal history of other benign neoplasm     History of uterine leiomyoma     Past Surgical History:   Procedure Laterality Date    OTHER SURGICAL HISTORY  09/28/2021    Hip surgery    OTHER SURGICAL HISTORY  09/28/2021    Hysterectomy    OTHER SURGICAL HISTORY  08/18/2022    Knee surgery    OTHER SURGICAL HISTORY  01/21/2022    Esophagogastroduodenoscopy    OTHER SURGICAL HISTORY  02/22/2022    Complete colonoscopy     No family history on  file.  Social History     Tobacco Use    Smoking status: Not on file    Smokeless tobacco: Not on file   Substance Use Topics    Alcohol use: Not on file    Drug use: Not on file       Physical Exam   ED Triage Vitals [12/12/23 1035]   Temp Heart Rate Resp BP   36.4 °C (97.5 °F) (!) 48 18 137/63      SpO2 Temp Source Heart Rate Source Patient Position   -- Temporal Monitor --      BP Location FiO2 (%)     -- --       Physical Exam  Constitutional:       Appearance: Normal appearance. She is normal weight.   HENT:      Head: Normocephalic and atraumatic.      Nose: Nose normal.      Mouth/Throat:      Mouth: Mucous membranes are moist.      Pharynx: Oropharynx is clear.   Eyes:      Extraocular Movements: Extraocular movements intact.      Conjunctiva/sclera: Conjunctivae normal.      Pupils: Pupils are equal, round, and reactive to light.   Cardiovascular:      Rate and Rhythm: Normal rate and regular rhythm.      Pulses: Normal pulses.      Heart sounds: Normal heart sounds.   Pulmonary:      Effort: Pulmonary effort is normal.      Breath sounds: Normal breath sounds.   Chest:      Chest wall: Tenderness present.      Comments: Reproducible chest pain on sternal/xiphoid palpation.  Abdominal:      General: Abdomen is flat. Bowel sounds are normal.      Palpations: Abdomen is soft.   Musculoskeletal:         General: Normal range of motion.      Cervical back: Normal range of motion and neck supple.   Skin:     General: Skin is warm and dry.      Capillary Refill: Capillary refill takes less than 2 seconds.   Neurological:      General: No focal deficit present.      Mental Status: She is alert and oriented to person, place, and time. Mental status is at baseline.   Psychiatric:         Mood and Affect: Mood normal.         Behavior: Behavior normal.         ED Course & MDM        Medical Decision Making  Patient is a 82 y.o. female who presents to [facility] ED for Chest Pain (Heaviness with belching). On initial ED  evaluation, patient found to be in no acute distress. Per HPI, concern to evaluate and treat for ACS versus musculoskeletal pain versus acute upper GI pathology.  Obtaining cardiac/abdominal relevant labs/diagnostic.  Chest x-ray negative for any acute cardiopulmonary process.  All lab work reviewed, grossly unremarkable given patient baseline.  Patient however, still having persistent central chest pain.  With significant cardiac comorbidities, patient would benefit from inpatient evaluation and cardiac consult.  Case discussed with Dr. Bañuelos, hospitalist.  Patient accepted to her service.              Procedure  Procedures     Hanna Erickson MD  Resident  12/12/23 [id number]

## 2023-12-13 ENCOUNTER — APPOINTMENT (OUTPATIENT)
Dept: CARDIOLOGY | Facility: HOSPITAL | Age: 83
End: 2023-12-13
Payer: MEDICARE

## 2023-12-13 ENCOUNTER — TELEPHONE (OUTPATIENT)
Dept: PRIMARY CARE | Facility: CLINIC | Age: 83
End: 2023-12-13

## 2023-12-13 ENCOUNTER — APPOINTMENT (OUTPATIENT)
Dept: PRIMARY CARE | Facility: CLINIC | Age: 83
End: 2023-12-13
Payer: MEDICARE

## 2023-12-13 ENCOUNTER — APPOINTMENT (OUTPATIENT)
Dept: NEPHROLOGY | Facility: CLINIC | Age: 83
End: 2023-12-13
Payer: MEDICARE

## 2023-12-13 LAB
ANION GAP SERPL CALC-SCNC: 14 MMOL/L (ref 10–20)
BUN SERPL-MCNC: 27 MG/DL (ref 6–23)
CALCIUM SERPL-MCNC: 9 MG/DL (ref 8.6–10.3)
CHLORIDE SERPL-SCNC: 107 MMOL/L (ref 98–107)
CO2 SERPL-SCNC: 24 MMOL/L (ref 21–32)
CREAT SERPL-MCNC: 1.38 MG/DL (ref 0.5–1.05)
ERYTHROCYTE [DISTWIDTH] IN BLOOD BY AUTOMATED COUNT: 14.3 % (ref 11.5–14.5)
GFR SERPL CREATININE-BSD FRML MDRD: 39 ML/MIN/1.73M*2
GLUCOSE BLD MANUAL STRIP-MCNC: 145 MG/DL (ref 74–99)
GLUCOSE BLD MANUAL STRIP-MCNC: 159 MG/DL (ref 74–99)
GLUCOSE BLD MANUAL STRIP-MCNC: 160 MG/DL (ref 74–99)
GLUCOSE BLD MANUAL STRIP-MCNC: 191 MG/DL (ref 74–99)
GLUCOSE SERPL-MCNC: 129 MG/DL (ref 74–99)
HCT VFR BLD AUTO: 44.2 % (ref 36–46)
HGB BLD-MCNC: 13.7 G/DL (ref 12–16)
MCH RBC QN AUTO: 31.2 PG (ref 26–34)
MCHC RBC AUTO-ENTMCNC: 31 G/DL (ref 32–36)
MCV RBC AUTO: 101 FL (ref 80–100)
NRBC BLD-RTO: 0 /100 WBCS (ref 0–0)
PLATELET # BLD AUTO: 176 X10*3/UL (ref 150–450)
POTASSIUM SERPL-SCNC: 4.2 MMOL/L (ref 3.5–5.3)
RBC # BLD AUTO: 4.39 X10*6/UL (ref 4–5.2)
SODIUM SERPL-SCNC: 141 MMOL/L (ref 136–145)
WBC # BLD AUTO: 6.8 X10*3/UL (ref 4.4–11.3)

## 2023-12-13 PROCEDURE — 93306 TTE W/DOPPLER COMPLETE: CPT

## 2023-12-13 PROCEDURE — C9113 INJ PANTOPRAZOLE SODIUM, VIA: HCPCS | Performed by: NURSE PRACTITIONER

## 2023-12-13 PROCEDURE — 2500000001 HC RX 250 WO HCPCS SELF ADMINISTERED DRUGS (ALT 637 FOR MEDICARE OP): Performed by: NURSE PRACTITIONER

## 2023-12-13 PROCEDURE — 94760 N-INVAS EAR/PLS OXIMETRY 1: CPT

## 2023-12-13 PROCEDURE — 96376 TX/PRO/DX INJ SAME DRUG ADON: CPT | Mod: 59

## 2023-12-13 PROCEDURE — 93005 ELECTROCARDIOGRAM TRACING: CPT

## 2023-12-13 PROCEDURE — G0378 HOSPITAL OBSERVATION PER HR: HCPCS

## 2023-12-13 PROCEDURE — 36415 COLL VENOUS BLD VENIPUNCTURE: CPT | Performed by: NURSE PRACTITIONER

## 2023-12-13 PROCEDURE — 85027 COMPLETE CBC AUTOMATED: CPT | Performed by: NURSE PRACTITIONER

## 2023-12-13 PROCEDURE — 2500000002 HC RX 250 W HCPCS SELF ADMINISTERED DRUGS (ALT 637 FOR MEDICARE OP, ALT 636 FOR OP/ED): Performed by: NURSE PRACTITIONER

## 2023-12-13 PROCEDURE — 82947 ASSAY GLUCOSE BLOOD QUANT: CPT

## 2023-12-13 PROCEDURE — 96372 THER/PROPH/DIAG INJ SC/IM: CPT | Performed by: NURSE PRACTITIONER

## 2023-12-13 PROCEDURE — 2500000004 HC RX 250 GENERAL PHARMACY W/ HCPCS (ALT 636 FOR OP/ED): Performed by: NURSE PRACTITIONER

## 2023-12-13 PROCEDURE — 99222 1ST HOSP IP/OBS MODERATE 55: CPT | Performed by: INTERNAL MEDICINE

## 2023-12-13 PROCEDURE — 80048 BASIC METABOLIC PNL TOTAL CA: CPT | Performed by: NURSE PRACTITIONER

## 2023-12-13 PROCEDURE — 99232 SBSQ HOSP IP/OBS MODERATE 35: CPT | Performed by: NURSE PRACTITIONER

## 2023-12-13 RX ORDER — PANTOPRAZOLE SODIUM 40 MG/1
40 TABLET, DELAYED RELEASE ORAL DAILY
Qty: 21 TABLET | Refills: 0 | Status: SHIPPED | OUTPATIENT
Start: 2023-12-13 | End: 2024-03-27 | Stop reason: ALTCHOICE

## 2023-12-13 RX ADMIN — INSULIN LISPRO 2 UNITS: 100 INJECTION, SOLUTION INTRAVENOUS; SUBCUTANEOUS at 16:55

## 2023-12-13 RX ADMIN — LATANOPROST 1 DROP: 50 SOLUTION/ DROPS OPHTHALMIC at 20:21

## 2023-12-13 RX ADMIN — CALCITRIOL CAPSULES 0.25 MCG 0.25 MCG: 0.25 CAPSULE ORAL at 09:50

## 2023-12-13 RX ADMIN — HEPARIN SODIUM 5000 UNITS: 5000 INJECTION INTRAVENOUS; SUBCUTANEOUS at 12:56

## 2023-12-13 RX ADMIN — SIMVASTATIN 40 MG: 40 TABLET, FILM COATED ORAL at 20:21

## 2023-12-13 RX ADMIN — CALCIUM CARBONATE (ANTACID) CHEW TAB 500 MG 500 MG: 500 CHEW TAB at 09:51

## 2023-12-13 RX ADMIN — LISINOPRIL 2.5 MG: 5 TABLET ORAL at 09:51

## 2023-12-13 RX ADMIN — INSULIN LISPRO 2 UNITS: 100 INJECTION, SOLUTION INTRAVENOUS; SUBCUTANEOUS at 12:56

## 2023-12-13 RX ADMIN — ATENOLOL 12.5 MG: 25 TABLET ORAL at 09:50

## 2023-12-13 RX ADMIN — PANTOPRAZOLE SODIUM 40 MG: 40 INJECTION, POWDER, FOR SOLUTION INTRAVENOUS at 10:00

## 2023-12-13 RX ADMIN — HEPARIN SODIUM 5000 UNITS: 5000 INJECTION INTRAVENOUS; SUBCUTANEOUS at 06:36

## 2023-12-13 RX ADMIN — BUMETANIDE 0.5 MG: 1 TABLET ORAL at 09:52

## 2023-12-13 RX ADMIN — EMPAGLIFLOZIN 25 MG: 25 TABLET, FILM COATED ORAL at 09:52

## 2023-12-13 RX ADMIN — CALCIUM CARBONATE (ANTACID) CHEW TAB 500 MG 500 MG: 500 CHEW TAB at 20:21

## 2023-12-13 RX ADMIN — HEPARIN SODIUM 5000 UNITS: 5000 INJECTION INTRAVENOUS; SUBCUTANEOUS at 20:21

## 2023-12-13 RX ADMIN — ALLOPURINOL 300 MG: 300 TABLET ORAL at 09:50

## 2023-12-13 ASSESSMENT — COGNITIVE AND FUNCTIONAL STATUS - GENERAL
WALKING IN HOSPITAL ROOM: A LITTLE
STANDING UP FROM CHAIR USING ARMS: A LITTLE
DRESSING REGULAR LOWER BODY CLOTHING: A LITTLE
HELP NEEDED FOR BATHING: A LITTLE
CLIMB 3 TO 5 STEPS WITH RAILING: A LITTLE
MOVING TO AND FROM BED TO CHAIR: A LITTLE
TOILETING: A LITTLE
MOBILITY SCORE: 20
DRESSING REGULAR UPPER BODY CLOTHING: A LITTLE
PERSONAL GROOMING: A LITTLE
PERSONAL GROOMING: A LITTLE
CLIMB 3 TO 5 STEPS WITH RAILING: A LITTLE
DAILY ACTIVITIY SCORE: 20
MOBILITY SCORE: 20
DAILY ACTIVITIY SCORE: 20
MOVING TO AND FROM BED TO CHAIR: A LITTLE
DRESSING REGULAR LOWER BODY CLOTHING: A LITTLE
WALKING IN HOSPITAL ROOM: A LITTLE
EATING MEALS: A LITTLE
STANDING UP FROM CHAIR USING ARMS: A LITTLE

## 2023-12-13 ASSESSMENT — PAIN SCALES - GENERAL
PAINLEVEL_OUTOF10: 0 - NO PAIN
PAINLEVEL_OUTOF10: 0 - NO PAIN

## 2023-12-13 NOTE — DISCHARGE INSTRUCTIONS
Follow up with PCP within 1 week of discharge  Follow up with EP, Within 2 weeks of discharge  Cardiac event monitor at time of discharge-return to hospital 12/14/23 for monitor  Continue to hold atenolol

## 2023-12-13 NOTE — TELEPHONE ENCOUNTER
Left a message to call the office back   Provider Procedure Text (D): After obtaining clear surgical margins the defect was repaired by another provider.

## 2023-12-13 NOTE — CONSULTS
"CARDIOLOGY/ELECTROPHYSIOLOGY CONSULTATION    PATIENT NAME: Lakshmi Trinh  PATIENT MRN: [id number]  SERVICE DATE:  12/13/2023  SERVICE TIME: 5:52 PM    CONSULTANT: Manolo Lacy MD  PRIMARY CARE PHYSICIAN: Misty Rincon MD  ATTENDING PHYSICIAN: Enrique Vick MD      IMPRESSIONS:  1.  Intermittent lightheadedness, some of which may be related to vertigo but with her bradycardia as a possible contributor.  She is on low-dose beta-blocker therapy, which is now relatively contraindicated.  2.  Sinus bradycardia with rate in 40s.  This is associated with normotension in hospital.  I am concerned that the patient may have chronotropic incompetence at home, which may limit her functional capacity.  3.  Chest heaviness on admission, with a \"pressure\" sensation that could represent myocardial ischemia.  Further evaluation is recommended, especially given her coronary artery disease risk factors (hypertension, diabetes, hyperlipidemia, age).  4.  Ménière's disease with intermittent vertigo symptoms.  5.  Longstanding type 2 diabetes with diabetic nephropathy and stage III chronic kidney disease.  6.  Chronic hypertension, well-controlled.  7.  Other medical issues, including hyperlipidemia, degenerative joint and spine disease, glaucoma, hyperparathyroidism, and reported peripheral vascular disease.    RECOMMENDATIONS:  1.  I recommend complete discontinuation of beta-blocker therapy.  2.  The patient is free for discharge home with a cardiac event monitor to document her rhythm with physical activity.  If her maximal heart rate is 90 bpm or less, a case could be made to place a permanent pacemaker to restore chronotropic competence.  3.  In regard to her chest discomfort, I recommend an outpatient Lexiscan study to evaluate for occult coronary disease.    Thank you for this consultation.  The patient will follow-up with Dr. Rincon and see me at the discretion of Dr. Rincon, as I am in the same " office.      SIGNATURE: Manolo Lacy MD   OFFICE: [phone]    ================================================================    REASON FOR CONSULTATION: Bradycardia    HISTORY: Lakshmi Trinh is an 82 y.o. white female, followed primarily by Dr. Misty Rincon, who presented on 12/12/2023 because of a variety of symptoms.  She felt some chest heaviness described as pressure, as well as some lightheadedness and nausea.  This was different than one of her typical vertigo attacks.  She was admitted under observation status to [address] [place] [address], where she has been in sinus bradycardia in the 40s and 50s.  Her symptoms has resolved and she is being prepared for discharge home.  Electrophysiology consultation was requested in regard to her bradycardia and lightheadedness.    PAST MEDICAL HISTORY: The patient has a history of longstanding hypertension, diabetes, hyperlipidemia, stage III chronic kidney disease, Ménière's disease, degenerative joint and spine disease, glaucoma, secondary hyperparathyroidism, and alleged peripheral vascular disease.  She reports having a stress test many years ago, but denies undergoing coronary angiography.  She has had chronic obesity and had a prior uterine leiomyoma.    PAST SURGICAL HISTORY: Prior surgeries have included hysterectomy, left hip replacement, and left knee replacement.    OUTPATIENT MEDICATIONS:  Medication Sig    atenolol (Tenormin) 25 mg tablet TAKE 1/2 TABLET BY MOUTH ONE TIME DAILY.    empagliflozin (Jardiance) 25 mg Take 1 tablet (25 mg) by mouth once daily.    lisinopril 2.5 mg tablet TAKE ONE TABLET BY MOUTH EVERY DAY    metFORMIN (Glucophage) 500 mg tablet TAKE ONE TABLET BY MOUTH TWO TIMES A DAY    simvastatin (Zocor) 40 mg tablet TAKE ONE TABLET BY MOUTH AT BEDTIME     ALLERGIES AND DRUG INTOLERANCES:   Allergen Reactions    Farxiga [Dapagliflozin] Diarrhea    Nsaids (Non-Steroidal Anti-Inflammatory Drug) Hives     FAMILY HISTORY: Not  "contributory    SOCIAL HISTORY: The patient is  and lives at home with her .  They have 2 adult children, 1 of whom lives locally and another in [place].  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  She smoked very briefly in her 20s.  She drinks one half of a 12-ounce cans of beer every Friday, with her  drinking the other half.    COMPLETE REVIEW OF SYSTEMS:  GENERAL: Negative for weight gain or loss  HEENT: Negative for vision or hearing problems; positive for known positional vertigo intermittently  RESPIRATORY: Negative for orthopnea, exertional dyspnea, or productive cough  CARDIAC: Negative for classic exertional angina or palpitations  VASCULAR: Negative for claudication or peripheral edema  GI: Negative for nausea, vomiting, hematemesis, melena, or hematochezia  MUSCULOSKELETAL: Positive for chronic back pain and lower extremity arthralgias  ENDOCRINE: Negative for heat or cold intolerance, polyuria or polydipsia  HEMATOLOGIC: Negative for bleeding problems  CUTANEOUS: Negative for rashes  NEURO: Negative for seizures; negative for focal neurologic symptoms; positive for lightheadedness without syncope  SLEEP: Negative for known snoring    PHYSICAL EXAM:  /54 (BP Location: Right arm, Patient Position: Lying)   Pulse 50   Temp 36.9 °C (98.4 °F) (Temporal)   Resp 18   Ht 1.626 m (5' 4\")   Wt 90.7 kg (200 lb)   SpO2 93%   BMI 34.33 kg/m²   Gen: Pleasant elderly woman in no distress; alert and oriented x 3  HEENT: Unremarkable; intact vision and hearing  Neck: No jugular venous distention noted  Cardiac: Regular rhythm with rate in the 50s, and no murmurs, rubs, or gallops  Resp: Clear to auscultation bilaterally, without wheezes or rales  Abd: Obese but benign, with no tenderness, no masses, and no organomegaly noted  Ext: Peripheral pulses intact; no peripheral edema  Neuro: Normal gross motor and sensory function; no focal " deficits noted  Skin: No lesions noted    EKG's: Sinus bradycardia at 42 to 43 bpm with a trivial right ventricular conduction delay and questionable tiny septal infarct; old EKGs also showed sinus bradycardia in the 50s    ECHOCARDIOGRAM: Completed, with official results pending; by my review, this showed normal LV systolic function (LVEF 65 to 70%) with mild LVH and mild biatrial enlargement    LABS:  Lab Results   Component Value Date    GLUCOSE 129 (H) 12/13/2023    CALCIUM 9.0 12/13/2023     12/13/2023    K 4.2 12/13/2023    CO2 24 12/13/2023     12/13/2023    BUN 27 (H) 12/13/2023    CREATININE 1.38 (H) 12/13/2023      Lab Results   Component Value Date    WBC 6.8 12/13/2023    HGB 13.7 12/13/2023    HCT 44.2 12/13/2023     (H) 12/13/2023     12/13/2023

## 2023-12-13 NOTE — TELEPHONE ENCOUNTER
----- Message from Jose Juan Dooley MD sent at 12/11/2023  4:23 PM EST -----  No protein in urine.

## 2023-12-13 NOTE — DISCHARGE SUMMARY
Discharge Diagnosis  Dizziness  Bradycardia  GERD  Chest pressure     Issues Requiring Follow-Up  Follow up with PCP within 1 week of discharge-may need GI referral   Follow up with EP Dr. Partida within 2 weeks of discharge  Cardiac event monitor at time of discharge  Hold Atenolol    Discharge Meds     Your medication list        ASK your doctor about these medications        Instructions Last Dose Given Next Dose Due   allopurinol 300 mg tablet  Commonly known as: Zyloprim           atenolol 25 mg tablet  Commonly known as: Tenormin      TAKE 1/2 TABLET BY MOUTH ONE TIME DAILY       bumetanide 0.5 mg tablet  Commonly known as: Bumex           calcitriol 0.25 mcg capsule  Commonly known as: Rocaltrol           Jardiance 25 mg  Generic drug: empagliflozin           latanoprost 0.005 % ophthalmic solution  Commonly known as: Xalatan           lisinopril 2.5 mg tablet      TAKE ONE TABLET BY MOUTH EVERY DAY       metFORMIN 500 mg tablet  Commonly known as: Glucophage      TAKE ONE TABLET BY MOUTH TWO TIMES A DAY       PreserVision AREDS 4,296 mcg-226 mg-90 mg capsule  Generic drug: vitamins A,C,E-zinc-copper           simvastatin 40 mg tablet  Commonly known as: Zocor      TAKE ONE TABLET BY MOUTH AT BEDTIME       timoloL maleate (PF) 0.5 % dropperette                    Test Results Pending At Discharge  Pending Labs       No current pending labs.            Hospital Course  Lakshmi Trinh is a 82 y.o. female with history HTN, CKD, diabetes, HLD, Ménière's disease presents to [facility] ER for central chest pain, belching, dizziness, nausea.  Patient reports that she had Ménière's disease related dizziness on Friday but was asymptomatic.  Patient reports that last night she was having some feelings of chest fullness after meals.  Patient reports that this morning she started having belching symptoms, dizziness that did not feel like her Ménière's disease, upper gastric lower non radiating chest pain.  Patient  denies associated fever, chills, vomiting, coughing, respiratory symptoms, abdominal pain, difficulty urinating, diarrhea, shortness of breath, or weakness.  Patient denies any radiation of chest pain to arm or neck. While in ED, patient was able to point to the location of her chest heaviness/pressure and with palpation was able to reproduce the pain, she then started to belch multiple times, indicating she wanted to eat. Patient asked if the chest pressure has resolved, she states that after receiving IV fluids, it took nearly all the discomfort away. Patient to be admitted for ACS rule out.    Course: Patient states chest pressure resolved with IV fluids. She never complained of chest pain. She was belching continuously and was given Protonix and Tums and she indicates her symptoms are much improved. Troponin's negative. CXR no active cardiopulmonary disease She denies any shortness of breath or chest pain, radiating pain, dizziness, nausea and or vomiting. She states she has been dizzy for sometime and believed it was from her Meniere's disease. However she states it has been different lately. Patient on atenolol and it was suspended due to patient having hear rates into the 40's.  All lab work reviewed, grossly unremarkable given patient baseline.     Pertinent Physical Exam At Time of Discharge  Physical Exam  Constitutional: Well developed, awake/alert/oriented x3, no distress, alert and cooperative   ENMT: mucous membranes moist   Head/Neck: Neck supple   Respiratory/Thorax: Patent airways, CTAB, normal breath sounds with good chest expansion, thorax symmetric   Cardiovascular: Bradycardia in 50-60's, no murmurs, 2+ equal pulses of the extremities, normal S 1and S 2   Gastrointestinal: Nondistended, soft, non-tender, no rebound tenderness or guarding, no masses palpable, no organomegaly, +BS, no bruits   Musculoskeletal: ROM intact, no joint swelling, normal strength   Extremities: normal extremities, no  cyanosis edema, contusions or wounds, no clubbing   Neurological: alert and oriented x3, intact senses, motor, response and reflexes, normal strength     Outpatient Follow-Up  Future Appointments   Date Time Provider Department Center   12/20/2023  9:45 AM Berhane Winslow MD [id number] Jatin James CNP  [facility]  [address] [address]  Phone: [phone] Fax: [phone]    [facility]

## 2023-12-13 NOTE — CARE PLAN
The patient's goals for the shift include To feel better and go home    The clinical goals for the shift include PT WILL USE THE CALL LIGHT WHEN ASSISTANCE IS NEEDED TO PREVENT FALLS

## 2023-12-14 ENCOUNTER — APPOINTMENT (OUTPATIENT)
Dept: CARDIOLOGY | Facility: HOSPITAL | Age: 83
End: 2023-12-14
Payer: MEDICARE

## 2023-12-14 VITALS
WEIGHT: 193.56 LBS | RESPIRATION RATE: 16 BRPM | HEART RATE: 49 BPM | OXYGEN SATURATION: 93 % | SYSTOLIC BLOOD PRESSURE: 104 MMHG | TEMPERATURE: 97.3 F | DIASTOLIC BLOOD PRESSURE: 52 MMHG | BODY MASS INDEX: 33.05 KG/M2 | HEIGHT: 64 IN

## 2023-12-14 LAB — GLUCOSE BLD MANUAL STRIP-MCNC: 187 MG/DL (ref 74–99)

## 2023-12-14 PROCEDURE — G0378 HOSPITAL OBSERVATION PER HR: HCPCS

## 2023-12-14 PROCEDURE — 93248 EXT ECG>7D<15D REV&INTERPJ: CPT | Performed by: INTERNAL MEDICINE

## 2023-12-14 PROCEDURE — 93246 EXT ECG>7D<15D RECORDING: CPT

## 2023-12-14 PROCEDURE — 2500000002 HC RX 250 W HCPCS SELF ADMINISTERED DRUGS (ALT 637 FOR MEDICARE OP, ALT 636 FOR OP/ED): Performed by: NURSE PRACTITIONER

## 2023-12-14 PROCEDURE — 82947 ASSAY GLUCOSE BLOOD QUANT: CPT

## 2023-12-14 PROCEDURE — 2500000001 HC RX 250 WO HCPCS SELF ADMINISTERED DRUGS (ALT 637 FOR MEDICARE OP): Performed by: NURSE PRACTITIONER

## 2023-12-14 RX ORDER — TIMOLOL MALEATE 5 MG/ML
1 SOLUTION/ DROPS OPHTHALMIC DAILY
Status: DISCONTINUED | OUTPATIENT
Start: 2023-12-14 | End: 2023-12-14 | Stop reason: HOSPADM

## 2023-12-14 RX ADMIN — ATENOLOL 12.5 MG: 25 TABLET ORAL at 08:14

## 2023-12-14 RX ADMIN — CALCIUM CARBONATE (ANTACID) CHEW TAB 500 MG 500 MG: 500 CHEW TAB at 08:12

## 2023-12-14 RX ADMIN — INSULIN LISPRO 2 UNITS: 100 INJECTION, SOLUTION INTRAVENOUS; SUBCUTANEOUS at 08:15

## 2023-12-14 RX ADMIN — CALCITRIOL CAPSULES 0.25 MCG 0.25 MCG: 0.25 CAPSULE ORAL at 08:13

## 2023-12-14 RX ADMIN — ALLOPURINOL 300 MG: 300 TABLET ORAL at 08:14

## 2023-12-14 RX ADMIN — LISINOPRIL 2.5 MG: 5 TABLET ORAL at 08:13

## 2023-12-14 RX ADMIN — BUMETANIDE 0.5 MG: 1 TABLET ORAL at 08:12

## 2023-12-14 RX ADMIN — EMPAGLIFLOZIN 25 MG: 25 TABLET, FILM COATED ORAL at 08:13

## 2023-12-14 ASSESSMENT — COGNITIVE AND FUNCTIONAL STATUS - GENERAL
DAILY ACTIVITIY SCORE: 24
CLIMB 3 TO 5 STEPS WITH RAILING: A LITTLE
MOBILITY SCORE: 23

## 2023-12-14 ASSESSMENT — PAIN SCALES - GENERAL: PAINLEVEL_OUTOF10: 0 - NO PAIN

## 2023-12-14 NOTE — CARE PLAN
The patient's goals for the shift include To feel better and go home    The clinical goals for the shift include PT WILL USE THE CALL LIGHT WHEN ASSSITANCE IS NEEDED TO PREVENT FALLS

## 2023-12-15 ENCOUNTER — PATIENT OUTREACH (OUTPATIENT)
Dept: CARDIOLOGY | Facility: CLINIC | Age: 83
End: 2023-12-15
Payer: MEDICARE

## 2023-12-15 NOTE — PROGRESS NOTES
Discharge Facility:  [facility]  Discharge Diagnosis:  Dizziness, Bradycardia, Gerds, Chest Pressure  Admission Date:  12/12/23  Discharge Date:  12/14/23    PCP Appointment Date: Dr Last 12/20/23  Specialist Appointment Date: calling for Dr. Lacy appt,   Hospital Encounter and Summary: Linked   See discharge assessment below for further details     Engagement  Call Start Time: 0940 (12/15/2023  9:47 AM)    Medications  Medications reviewed with patient/caregiver?: Yes (12/15/2023  9:47 AM)  Is the patient having any side effects they believe may be caused by any medication additions or changes?: No (12/15/2023  9:47 AM)  Does the patient have all medications ordered at discharge?: Yes (12/15/2023  9:47 AM)    Appointments  Does the patient have a primary care provider?: Yes (12/15/2023  9:47 AM)  Care Management Interventions: Verified appointment date/time/provider (12/15/2023  9:47 AM)  Care Management Interventions: Advised patient to keep appointment; Advised to schedule with specialist (Patient calling for an appt with Dr Lacy) (12/15/2023  9:47 AM)    Self Management  What is the home health agency?: n/a (12/15/2023  9:47 AM)  What Durable Medical Equipment (DME) was ordered?: n/a (12/15/2023  9:47 AM)    Patient Teaching  Care Management Interventions: Reviewed instructions with patient (12/15/2023  9:47 AM)  What is the patient's perception of their health status since discharge?: Improving (12/15/2023  9:47 AM)  Is the patient/caregiver able to teach back the hierarchy of who to call/visit for symptoms/problems? PCP, Specialist, Home Health nurse, Urgent Care, ED, 911: Yes (12/15/2023  9:47 AM)    Wrap Up  Wrap Up Additional Comments: Patient states she feel back to herself. SHe denies any CP or dizziness. Patient is taking medications without difficulties. Patient is aware of appt with Dr Berhane Winslow. Patient states she was able to get some rest last night. All questions answered (12/15/2023  9:47  AM)  Call End Time: 0949 (12/15/2023  9:47 AM)

## 2023-12-16 LAB
AORTIC VALVE PEAK VELOCITY: 1.04
AV PEAK GRADIENT: 4.3
AVA (PEAK VEL): 2.61
EJECTION FRACTION APICAL 4 CHAMBER: 61.8
EJECTION FRACTION: 62
LEFT ATRIUM VOLUME AREA LENGTH INDEX BSA: 20.6
LEFT VENTRICLE INTERNAL DIMENSION DIASTOLE: 4.53 (ref 3.5–6)
LEFT VENTRICULAR OUTFLOW TRACT DIAMETER: 2
MITRAL VALVE E/A RATIO: 0.82
MITRAL VALVE E/E' RATIO: 5.9
RIGHT VENTRICLE FREE WALL PEAK S': 10.8
RIGHT VENTRICLE PEAK SYSTOLIC PRESSURE: 23.8
TRICUSPID ANNULAR PLANE SYSTOLIC EXCURSION: 1.6

## 2023-12-18 NOTE — TELEPHONE ENCOUNTER
Patient called back for lab results and to schedule a hospital discharge appt with . Discharged 12/15 and was instructed by hospital to follow up with  within a week. Is it possible to squeeze her in?

## 2023-12-20 ENCOUNTER — OFFICE VISIT (OUTPATIENT)
Dept: PRIMARY CARE | Facility: CLINIC | Age: 83
End: 2023-12-20
Payer: MEDICARE

## 2023-12-20 ENCOUNTER — APPOINTMENT (OUTPATIENT)
Dept: VASCULAR SURGERY | Facility: CLINIC | Age: 83
End: 2023-12-20
Payer: MEDICARE

## 2023-12-20 VITALS
HEIGHT: 64 IN | TEMPERATURE: 97.2 F | SYSTOLIC BLOOD PRESSURE: 118 MMHG | DIASTOLIC BLOOD PRESSURE: 76 MMHG | WEIGHT: 196 LBS | BODY MASS INDEX: 33.46 KG/M2 | HEART RATE: 65 BPM

## 2023-12-20 DIAGNOSIS — I87.309 CHRONIC PERIPHERAL VENOUS HYPERTENSION: Primary | ICD-10-CM

## 2023-12-20 DIAGNOSIS — M79.89 OTHER SPECIFIED SOFT TISSUE DISORDERS: ICD-10-CM

## 2023-12-20 PROCEDURE — 1160F RVW MEDS BY RX/DR IN RCRD: CPT | Performed by: INTERNAL MEDICINE

## 2023-12-20 PROCEDURE — 3074F SYST BP LT 130 MM HG: CPT | Performed by: INTERNAL MEDICINE

## 2023-12-20 PROCEDURE — 1036F TOBACCO NON-USER: CPT | Performed by: INTERNAL MEDICINE

## 2023-12-20 PROCEDURE — 99213 OFFICE O/P EST LOW 20 MIN: CPT | Performed by: INTERNAL MEDICINE

## 2023-12-20 PROCEDURE — 3078F DIAST BP <80 MM HG: CPT | Performed by: INTERNAL MEDICINE

## 2023-12-20 PROCEDURE — 1125F AMNT PAIN NOTED PAIN PRSNT: CPT | Performed by: INTERNAL MEDICINE

## 2023-12-20 PROCEDURE — 1159F MED LIST DOCD IN RCRD: CPT | Performed by: INTERNAL MEDICINE

## 2023-12-20 NOTE — PROGRESS NOTES
Chief Complaints:  Seen for follow-up after 3 months of procedures.    HPI:  83 years old female who has had significant bilateral chronic venous insufficiency and was successfully treated at some postprocedural concern with trapped blood or intravascular coagulum and staining of the legs has significant improvement in overall condition came for a follow-up visit.  Her leg symptoms have improved and occasional cramps in her calf areas but they do not bother her too much.  Patient is wearing the knee-high graduated compression stockings but not regularly.    She recently had dizziness and was admitted to the hospital with bradycardia and she is wearing a Holter monitor and she is planning to follow-up with the electrophysiologist and other physicians including primary care physician and cardiologist.    She denies any chest pain or short of breath no fever no chills.    Rest of the review of systems no acute complaints specifically pertaining to chronic venous insufficiency.      Current Outpatient Medications on File Prior to Visit   Medication Sig Dispense Refill    allopurinol (Zyloprim) 300 mg tablet Take 1 tablet (300 mg) by mouth once daily.      bumetanide (Bumex) 0.5 mg tablet Take 1 tablet (0.5 mg) by mouth once daily.      calcitriol (Rocaltrol) 0.25 mcg capsule Take 1 capsule (0.25 mcg) by mouth. Take 2 capsules by mouth over the weekend and 1 capsule during the week      empagliflozin (Jardiance) 25 mg Take 1 tablet (25 mg) by mouth once daily.      latanoprost (Xalatan) 0.005 % ophthalmic solution Administer 1 drop into both eyes once daily at bedtime.      lisinopril 2.5 mg tablet TAKE ONE TABLET BY MOUTH EVERY DAY 90 tablet 0    metFORMIN (Glucophage) 500 mg tablet TAKE ONE TABLET BY MOUTH TWO TIMES A  tablet 0    pantoprazole (ProtoNix) 40 mg EC tablet Take 1 tablet (40 mg) by mouth once daily for 21 days. Do not crush, chew, or split. 21 tablet 0    simvastatin (Zocor) 40 mg tablet TAKE ONE  "TABLET BY MOUTH AT BEDTIME 90 tablet 0    timoloL maleate, PF, 0.5 % dropperette Administer 1 drop into both eyes twice a day.      vitamins A,C,E-zinc-copper (PreserVision AREDS) 4,296 mcg-226 mg-90 mg capsule Take 1 tablet by mouth every 12 hours.      [DISCONTINUED] atenolol (Tenormin) 25 mg tablet TAKE 1/2 TABLET BY MOUTH ONE TIME DAILY (Patient taking differently: Take 0.5 tablets (12.5 mg) by mouth once daily.) 45 tablet 0     No current facility-administered medications on file prior to visit.        Allergies   Allergen Reactions    Farxiga [Dapagliflozin] Diarrhea    Nsaids (Non-Steroidal Anti-Inflammatory Drug) Hives        Examination:    Visit Vitals  /76 (BP Location: Left arm, Patient Position: Sitting)   Pulse 65   Temp 36.2 °C (97.2 °F)   Ht 1.626 m (5' 4\")   Wt 88.9 kg (196 lb)   BMI 33.64 kg/m²   Smoking Status Former   BSA 2 m²        Moderate obese, well-nourished with no apparent distress. Alert oriented  Skin: Normal turgor. No rash.  Head: Normocephalic, atraumatic.  Eyes: Pupils are equal, round,.  No pallor of conjunctivae. Mucous membranes are moist.  Neck: Supple. No JVD.   No clubbing, has peripheral osteoarthritis  Left upper chest area patient has Holter monitor in place.  Chest: Vesicular breathing Bilaterally moderate air entry. No wheezing.  No crepitations.  Heart: Regular rate and rhythm. S1, S2 positive. No murmur.  Abdomen: Soft and nontender. Bowel sounds are positive. No organomegaly.  Extremities: Bilaterally 2+ dorsalis pedis pulses.  Both legs previously treated areas are much improved and no lumpiness noted or felt.  No evidence of staining on the skin.  No calf tenderness. Homans sign is negative.  Neuro Exam: No focal signs. Gait able to walk independently.        Venous Exam:  Varicose veins in left posterior calf [ absent .  Varicose veins in left thigh absent ].  Varicose veins in right calf [ absent  Varicose veins in right thigh [absent  Reticular veins in left " calf [ present   Reticular veins in left thigh absent ].  Reticular veins in right calf [ present   Reticular veins in right thigh [ present   Telangiectasias in left calf [ present   Telangiectasias in left thigh absent ].  Telangiectasias in right calf [ present   Telangiectasias in right thigh absent ].  Right leg no edema [ present   Left leg no edema [ present   Hyperpigmentation in left leg absent ].  Hyperpigmentation in right leg absent ].  Lipodermatosclerosis in right leg absent ].  Lipodermatosclerosis in left leg absent ].  Dermatitis in left leg [absent  Dermatitis in right leg [absent  Corona phlebectatica in left leg absent ].  Corona phlebectatica in right leg absent ].  Atrophe ramon in left leg absent ].  Atrophe ramon in right leg absent ].  Ulcer(s) in left leg absent ].  Ulcer(s) in right leg [ absent ].     Right leg CEAP C2S     Left leg CEAP C 2S         Diagnosis/ Problems    Assessment:    _ Varicose veins of bilateral lower extremities with other complications - I83.893 (Primary)  _ Chronic venous insufficiency - I87.2  _ Other specified soft tissue disorders - M79.89  _ Varicose veins of both lower extremities with complications - 454.8    Problem List Items Addressed This Visit       Chronic peripheral venous hypertension - Primary    Relevant Orders    Vascular US Lower Extremity Vein Mapping Bilateral     Other Visit Diagnoses       Other specified soft tissue disorders        Relevant Orders    Vascular US Lower Extremity Vein Mapping Bilateral               Plan     Chronic venous insufficiency of bilateral lower extremities with other complications  Because of patient's history of advanced chronic venous insufficiency and has had significant symptoms and some of them are persisting we will do a detailed venous ultrasound evaluation.  But patient understands she could do this anytime after her cardiac evaluation is completed and she is stable.  Depending on the ultrasound  evaluation we will decide about any appointment she needs immediately after that otherwise she will follow-up with me in 6 months time.    Discussions  Patient has significant improvement from the bilateral leg venous congestions patient also following the appropriate instructions including wearing the graduated compression stockings and lifestyle modifications.    Patient understands to continue to wear the graduated compression stockings and lifestyle modifications as we have discussed in the past and today.

## 2023-12-21 ENCOUNTER — OFFICE VISIT (OUTPATIENT)
Dept: PRIMARY CARE | Facility: CLINIC | Age: 83
End: 2023-12-21
Payer: MEDICARE

## 2023-12-21 VITALS
HEART RATE: 71 BPM | SYSTOLIC BLOOD PRESSURE: 107 MMHG | HEIGHT: 64 IN | DIASTOLIC BLOOD PRESSURE: 70 MMHG | TEMPERATURE: 97 F | BODY MASS INDEX: 33.46 KG/M2 | WEIGHT: 196 LBS

## 2023-12-21 DIAGNOSIS — E11.22 TYPE 2 DIABETES MELLITUS WITH STAGE 3B CHRONIC KIDNEY DISEASE, WITHOUT LONG-TERM CURRENT USE OF INSULIN (MULTI): ICD-10-CM

## 2023-12-21 DIAGNOSIS — R07.9 CHEST PAIN, UNSPECIFIED TYPE: ICD-10-CM

## 2023-12-21 DIAGNOSIS — Z09 HOSPITAL DISCHARGE FOLLOW-UP: Primary | ICD-10-CM

## 2023-12-21 DIAGNOSIS — R14.2 BELCHING: ICD-10-CM

## 2023-12-21 DIAGNOSIS — R68.89 DECREASED EXERCISE TOLERANCE: ICD-10-CM

## 2023-12-21 DIAGNOSIS — M79.18 MYALGIA, PELVIC REGION AND THIGH: ICD-10-CM

## 2023-12-21 DIAGNOSIS — N18.32 TYPE 2 DIABETES MELLITUS WITH STAGE 3B CHRONIC KIDNEY DISEASE, WITHOUT LONG-TERM CURRENT USE OF INSULIN (MULTI): ICD-10-CM

## 2023-12-21 PROBLEM — M85.80 OSTEOPENIA: Status: ACTIVE | Noted: 2023-12-21

## 2023-12-21 PROBLEM — Z96.652 STATUS POST TOTAL LEFT KNEE REPLACEMENT: Status: RESOLVED | Noted: 2017-06-15 | Resolved: 2023-12-21

## 2023-12-21 PROBLEM — M75.00 DIABETIC FROZEN SHOULDER ASSOCIATED WITH TYPE 2 DIABETES MELLITUS (MULTI): Status: RESOLVED | Noted: 2023-12-21 | Resolved: 2023-12-21

## 2023-12-21 PROBLEM — N39.46 URGE AND STRESS INCONTINENCE: Status: ACTIVE | Noted: 2023-12-21

## 2023-12-21 PROBLEM — E79.0 HYPERURICEMIA: Status: ACTIVE | Noted: 2023-12-21

## 2023-12-21 PROBLEM — E11.618 DIABETIC FROZEN SHOULDER ASSOCIATED WITH TYPE 2 DIABETES MELLITUS (MULTI): Status: RESOLVED | Noted: 2023-12-21 | Resolved: 2023-12-21

## 2023-12-21 LAB — POC HEMOGLOBIN A1C: 7.4 % (ref 4.2–6.5)

## 2023-12-21 PROCEDURE — 3078F DIAST BP <80 MM HG: CPT | Performed by: FAMILY MEDICINE

## 2023-12-21 PROCEDURE — 1160F RVW MEDS BY RX/DR IN RCRD: CPT | Performed by: FAMILY MEDICINE

## 2023-12-21 PROCEDURE — 83036 HEMOGLOBIN GLYCOSYLATED A1C: CPT | Performed by: FAMILY MEDICINE

## 2023-12-21 PROCEDURE — 3074F SYST BP LT 130 MM HG: CPT | Performed by: FAMILY MEDICINE

## 2023-12-21 PROCEDURE — 1125F AMNT PAIN NOTED PAIN PRSNT: CPT | Performed by: FAMILY MEDICINE

## 2023-12-21 PROCEDURE — 99495 TRANSJ CARE MGMT MOD F2F 14D: CPT | Performed by: FAMILY MEDICINE

## 2023-12-21 PROCEDURE — 1036F TOBACCO NON-USER: CPT | Performed by: FAMILY MEDICINE

## 2023-12-21 PROCEDURE — 1159F MED LIST DOCD IN RCRD: CPT | Performed by: FAMILY MEDICINE

## 2023-12-21 RX ORDER — NITROGLYCERIN 0.4 MG/1
0.4 TABLET SUBLINGUAL EVERY 5 MIN PRN
Qty: 30 TABLET | Refills: 0 | Status: SHIPPED | OUTPATIENT
Start: 2023-12-21 | End: 2024-12-20

## 2023-12-21 RX ORDER — REGADENOSON 0.08 MG/ML
0.4 INJECTION, SOLUTION INTRAVENOUS
Status: CANCELLED | OUTPATIENT
Start: 2023-12-21

## 2023-12-21 ASSESSMENT — ENCOUNTER SYMPTOMS
LIGHT-HEADEDNESS: 0
CHEST TIGHTNESS: 0
VOMITING: 0
FATIGUE: 1
CONSTIPATION: 0
HEADACHES: 0
DIARRHEA: 0
MYALGIAS: 1
ABDOMINAL DISTENTION: 1
RHINORRHEA: 0
SORE THROAT: 0
NAUSEA: 0
COUGH: 0
PALPITATIONS: 0
ROS GI COMMENTS: FREQUENT BELCHING
DIZZINESS: 1
SHORTNESS OF BREATH: 1

## 2023-12-21 ASSESSMENT — PATIENT HEALTH QUESTIONNAIRE - PHQ9
SUM OF ALL RESPONSES TO PHQ9 QUESTIONS 1 AND 2: 0
2. FEELING DOWN, DEPRESSED OR HOPELESS: NOT AT ALL
1. LITTLE INTEREST OR PLEASURE IN DOING THINGS: NOT AT ALL

## 2023-12-21 NOTE — PROGRESS NOTES
"Subjective   Patient ID: Lakshmi Trinh is a 83 y.o. female who presents for Follow-up (Follow up).    HPI   Here follow up recent hospital admission.  Presented to ED via EMS with chest pressure, bloating, belching, nausea, dizziness.  Upon evaluation, found to be bradycardic.  Seen by EP cardiology.  Atenolol discontinued.  Event monitor placed.  Given PPI therapy.  since discharge, belching and bloating have improved but not fully resolved.  No further chest pressure or chest pain, but continues to have increased fatigue from baseline, more dyspnea with exertional activities.    Needs med refills.  DM-compliant with meds.  On steroids this fall from ortho fo rbursitis.     Myalgia-increased achiness in upper thighs, hips for several months.  Worse after prolonged sitting.  Denies weakness.    Review of Systems   Constitutional:  Positive for fatigue.   HENT:  Negative for congestion, ear pain, rhinorrhea, sneezing and sore throat.    Eyes:  Negative for visual disturbance.   Respiratory:  Positive for shortness of breath (mild with exertion). Negative for cough and chest tightness.    Cardiovascular:  Positive for chest pain (chest pressure) and leg swelling. Negative for palpitations.        Decreased stamina  Exhausted after errands yesterday   Gastrointestinal:  Positive for abdominal distention. Negative for constipation, diarrhea, nausea and vomiting.        Frequent belching   Musculoskeletal:  Positive for myalgias (few month history aching in both thighs).   Neurological:  Positive for dizziness (dizziness prior to admission, none since d/c). Negative for light-headedness and headaches.       Objective   /70 (BP Location: Left arm, Patient Position: Sitting, BP Cuff Size: Large adult)   Pulse 71   Temp 36.1 °C (97 °F)   Ht 1.626 m (5' 4\")   Wt 88.9 kg (196 lb)   BMI 33.64 kg/m²     Physical Exam  Constitutional:       General: She is not in acute distress.     Appearance: Normal appearance. " She is obese.   HENT:      Head: Normocephalic.      Right Ear: Tympanic membrane and ear canal normal.      Left Ear: Tympanic membrane and ear canal normal.      Ears:      Comments: Hearing intact to finger rub     Nose: Nose normal. No congestion or rhinorrhea.      Mouth/Throat:      Mouth: Mucous membranes are moist.      Pharynx: Oropharynx is clear.   Eyes:      Extraocular Movements: Extraocular movements intact.      Conjunctiva/sclera: Conjunctivae normal.      Pupils: Pupils are equal, round, and reactive to light.   Cardiovascular:      Rate and Rhythm: Normal rate and regular rhythm.      Pulses: Normal pulses.      Heart sounds: Normal heart sounds. No murmur heard.  Pulmonary:      Effort: Pulmonary effort is normal. No respiratory distress.      Breath sounds: Normal breath sounds.   Abdominal:      General: Abdomen is flat. Bowel sounds are normal.      Palpations: Abdomen is soft.      Tenderness: There is no abdominal tenderness.   Musculoskeletal:         General: Normal range of motion.      Cervical back: Normal range of motion and neck supple.      Comments: Mild tenderness to palpation over the pelvic muscle girdle bilaterally.   Lymphadenopathy:      Cervical: No cervical adenopathy.   Skin:     General: Skin is warm and dry.   Neurological:      General: No focal deficit present.      Mental Status: She is alert and oriented to person, place, and time.   Psychiatric:         Mood and Affect: Mood normal.         Thought Content: Thought content normal.         Assessment/Plan   Diagnoses and all orders for this visit:  Hospital discharge follow-up  Chest pain, unspecified type  Reviewed hospital records.  Cardiology recommended outpatient stress test which is appropriate given patient's decrease in exercise tolerance, shortness of breath with exertion.  Will also provide prescription for nitro to use as needed.  Aware to contact EMS if she has recurrent/persistent chest pain  -     Nuclear  Stress Test; Future  -     regadenoson (Lexiscan) injection 0.4 mg  -     nitroglycerin (Nitrostat) 0.4 mg SL tablet; Place 1 tablet (0.4 mg) under the tongue every 5 minutes if needed for chest pain. May repeat every 5 minutes for up to 3 doses.  Decreased exercise tolerance  -     Nuclear Stress Test; Future  -     regadenoson (Lexiscan) injection 0.4 mg  Belching  -     US right upper quadrant; Future  Type 2 diabetes mellitus with stage 3b chronic kidney disease, without long-term current use of insulin (CMS/[facility])  Recent A1c may be affected in part by steroids earlier this fall.  Encourage diet changes.  -     POCT glycosylated hemoglobin (Hb A1C) manually resulted  -     Nuclear Stress Test; Future  -     regadenoson (Lexiscan) injection 0.4 mg  -     empagliflozin (Jardiance) 25 mg; Take 1 tablet (25 mg) by mouth once daily.  -     Vitamin D 25-Hydroxy,Total (for eval of Vitamin D levels); Future  Myalgia, pelvic region and thigh  -     CBC and Auto Differential; Future  -     Creatine Kinase; Future  -     C-reactive protein; Future  -     Sedimentation Rate; Future  Other orders  -     Follow Up In Primary Care - Established; Future

## 2023-12-28 ENCOUNTER — TELEPHONE (OUTPATIENT)
Dept: VASCULAR SURGERY | Facility: CLINIC | Age: 83
End: 2023-12-28
Payer: MEDICARE

## 2023-12-28 ENCOUNTER — TELEPHONE (OUTPATIENT)
Dept: PRIMARY CARE | Facility: CLINIC | Age: 83
End: 2023-12-28
Payer: MEDICARE

## 2023-12-28 ENCOUNTER — PATIENT OUTREACH (OUTPATIENT)
Dept: CARDIOLOGY | Facility: CLINIC | Age: 83
End: 2023-12-28
Payer: MEDICARE

## 2023-12-28 DIAGNOSIS — N18.32 TYPE 2 DIABETES MELLITUS WITH STAGE 3B CHRONIC KIDNEY DISEASE, WITHOUT LONG-TERM CURRENT USE OF INSULIN (MULTI): ICD-10-CM

## 2023-12-28 DIAGNOSIS — E11.22 TYPE 2 DIABETES MELLITUS WITH STAGE 3B CHRONIC KIDNEY DISEASE, WITHOUT LONG-TERM CURRENT USE OF INSULIN (MULTI): ICD-10-CM

## 2023-12-28 DIAGNOSIS — I10 ESSENTIAL HYPERTENSION: Primary | Chronic | ICD-10-CM

## 2023-12-28 NOTE — TELEPHONE ENCOUNTER
Patient is asking if based on results of the monitor she wore and the echo she had done, if the Stress Test is necessary? She has been through a lot being in the hospital and has a pending cardiology apt in January

## 2023-12-28 NOTE — PROGRESS NOTES
Call regarding appt. with PCP on 12/21/23 after hospitalization.  At time of outreach call the patient feels as if their condition has  improved Reviewed the PCP appointment with the pt and addressed any questions or concerns.     Patient is having an increased in belching and will be getting an US of her right upper quad abd. Patient also states that her Holter monitor has burned her and she has just removed it. The office is aware per patient and that this is her 3rd try with different holter monitors.

## 2023-12-28 NOTE — TELEPHONE ENCOUNTER
Patient has a follow up US mapping scheduled for 02/14/2024, do you want her scheduled for a follow up appt for 1 week later as well? Please advise, thank you.

## 2023-12-29 ENCOUNTER — TELEPHONE (OUTPATIENT)
Dept: PRIMARY CARE | Facility: CLINIC | Age: 83
End: 2023-12-29
Payer: MEDICARE

## 2023-12-29 DIAGNOSIS — E11.22 TYPE 2 DIABETES MELLITUS WITH STAGE 3B CHRONIC KIDNEY DISEASE, WITHOUT LONG-TERM CURRENT USE OF INSULIN (MULTI): ICD-10-CM

## 2023-12-29 DIAGNOSIS — N18.32 TYPE 2 DIABETES MELLITUS WITH STAGE 3B CHRONIC KIDNEY DISEASE, WITHOUT LONG-TERM CURRENT USE OF INSULIN (MULTI): ICD-10-CM

## 2023-12-29 NOTE — TELEPHONE ENCOUNTER
Patient talked to mail order and was told that she is in the gap phase for her jardiance. She is asking that a 30 day supply script be sent to her local pharmacy [facility] on [address] in [place].

## 2023-12-29 NOTE — TELEPHONE ENCOUNTER
Pt aware and still going to think about having it done. Will let us know by next week if she will be cancelling the test.

## 2024-01-02 ENCOUNTER — ANCILLARY PROCEDURE (OUTPATIENT)
Dept: RADIOLOGY | Facility: CLINIC | Age: 84
End: 2024-01-02
Payer: MEDICARE

## 2024-01-02 DIAGNOSIS — R14.2 BELCHING: ICD-10-CM

## 2024-01-02 PROCEDURE — 76705 ECHO EXAM OF ABDOMEN: CPT

## 2024-01-02 PROCEDURE — 76705 ECHO EXAM OF ABDOMEN: CPT | Performed by: RADIOLOGY

## 2024-01-03 ENCOUNTER — TELEPHONE (OUTPATIENT)
Dept: PRIMARY CARE | Facility: CLINIC | Age: 84
End: 2024-01-03
Payer: MEDICARE

## 2024-01-03 NOTE — TELEPHONE ENCOUNTER
----- Message from Misty Rincon MD sent at 1/2/2024  3:04 PM EST -----  Ruq ultrasound shows gallbladder appears normal.  Is she still having increased belching? Bloating/chest pressure?

## 2024-01-03 NOTE — TELEPHONE ENCOUNTER
Telephone call to patient, spoke with patient, informed her of ultrasound results, gallbladder appears normal.  Patient states she is no longer having chest pressure or pain but is still belching.

## 2024-01-09 LAB — BODY SURFACE AREA: 2.02 M2

## 2024-01-10 ENCOUNTER — APPOINTMENT (OUTPATIENT)
Dept: RADIOLOGY | Facility: CLINIC | Age: 84
End: 2024-01-10
Payer: MEDICARE

## 2024-01-10 ENCOUNTER — APPOINTMENT (OUTPATIENT)
Dept: CARDIOLOGY | Facility: CLINIC | Age: 84
End: 2024-01-10
Payer: MEDICARE

## 2024-01-19 ENCOUNTER — OFFICE VISIT (OUTPATIENT)
Dept: CARDIOLOGY | Facility: CLINIC | Age: 84
End: 2024-01-19
Payer: MEDICARE

## 2024-01-19 VITALS
HEART RATE: 79 BPM | HEIGHT: 64 IN | WEIGHT: 198.2 LBS | SYSTOLIC BLOOD PRESSURE: 120 MMHG | BODY MASS INDEX: 33.84 KG/M2 | TEMPERATURE: 96.8 F | DIASTOLIC BLOOD PRESSURE: 70 MMHG

## 2024-01-19 DIAGNOSIS — R00.1 BRADYCARDIA: Primary | ICD-10-CM

## 2024-01-19 PROCEDURE — 3078F DIAST BP <80 MM HG: CPT | Performed by: INTERNAL MEDICINE

## 2024-01-19 PROCEDURE — 3074F SYST BP LT 130 MM HG: CPT | Performed by: INTERNAL MEDICINE

## 2024-01-19 PROCEDURE — 1160F RVW MEDS BY RX/DR IN RCRD: CPT | Performed by: INTERNAL MEDICINE

## 2024-01-19 PROCEDURE — 1159F MED LIST DOCD IN RCRD: CPT | Performed by: INTERNAL MEDICINE

## 2024-01-19 PROCEDURE — 1125F AMNT PAIN NOTED PAIN PRSNT: CPT | Performed by: INTERNAL MEDICINE

## 2024-01-19 PROCEDURE — 1036F TOBACCO NON-USER: CPT | Performed by: INTERNAL MEDICINE

## 2024-01-19 PROCEDURE — 99213 OFFICE O/P EST LOW 20 MIN: CPT | Performed by: INTERNAL MEDICINE

## 2024-01-19 RX ORDER — TRAMADOL HYDROCHLORIDE 50 MG/1
TABLET ORAL
COMMUNITY
End: 2024-03-27 | Stop reason: ALTCHOICE

## 2024-01-19 RX ORDER — CHOLECALCIFEROL (VITAMIN D3) 50 MCG
50 TABLET ORAL DAILY
COMMUNITY

## 2024-01-19 ASSESSMENT — PATIENT HEALTH QUESTIONNAIRE - PHQ9
1. LITTLE INTEREST OR PLEASURE IN DOING THINGS: NOT AT ALL
2. FEELING DOWN, DEPRESSED OR HOPELESS: NOT AT ALL
SUM OF ALL RESPONSES TO PHQ9 QUESTIONS 1 AND 2: 0

## 2024-01-19 NOTE — PROGRESS NOTES
Subjective:  The patient is an 83-year-old white female, followed primarily by Dr. Misty Rincon, who presents for follow-up after [place] [facility] [place]  [place] has a history of longstanding hypertension, type 2 diabetes, Ménière's disease, hyperlipidemia, degenerative joint and spine disease, hyperparathyroidism, glaucoma, and peripheral vascular disease.  She was hospitalized with lightheadedness that was felt to perhaps be due to her Ménière's disease, but she also had sinus bradycardia down into the 40s.  We elected to stop her beta-blocker therapy.  The patient underwent a 2-week event monitor thereafter, showing an average heart rate of 67 bpm, and some short runs of atrial tachycardia lasting just a few seconds at a time; this showed acceptable chronotropic competence.  The patient did not tolerate the monitor well, with skin irritation, and we obtain only 9 days of data rather than 14 days.    During her hospital stay in December, the patient also had some vague chest heaviness.  Because of this, I recommended an outpatient Lexiscan study.  The patient had this scheduled but then canceled it.  She has rescheduled it for February 2024, I believe.    The patient is currently doing reasonably well.  She still has some lightheadedness or dizziness related to head motion, consistent with ongoing vertigo from Ménière's disease.    Current Outpatient Medications   Medication Sig    allopurinol (Zyloprim) 300 mg tablet Take 1 tablet (300 mg) by mouth once daily.    bumetanide (Bumex) 0.5 mg tablet Take 1 tablet (0.5 mg) by mouth once daily.    calcitriol (Rocaltrol) 0.25 mcg capsule Take 1 capsule (0.25 mcg) by mouth. Take 2 capsules by mouth over the weekend and 1 capsule during the week    cholecalciferol (Vitamin D-3) 50 MCG (2000 UT) tablet Take 1 tablet (50 mcg) by mouth once daily.    empagliflozin (Jardiance) 25 mg Take 1 tablet (25 mg) by mouth once daily.     latanoprost (Xalatan) 0.005 % ophthalmic solution Administer 1 drop into both eyes once daily at bedtime.    lisinopril 2.5 mg tablet TAKE ONE TABLET BY MOUTH EVERY DAY    metFORMIN (Glucophage) 500 mg tablet TAKE ONE TABLET BY MOUTH TWO TIMES A DAY    nitroglycerin (Nitrostat) 0.4 mg SL tablet Place 1 tablet (0.4 mg) under the tongue every 5 minutes if needed for chest pain. May repeat every 5 minutes for up to 3 doses.    simvastatin (Zocor) 40 mg tablet TAKE ONE TABLET BY MOUTH AT BEDTIME    timoloL maleate, PF, 0.5 % dropperette Administer 1 drop into both eyes twice a day.    traMADol (Ultram) 50 mg tablet Take by mouth.    vitamins A,C,E-zinc-copper (PreserVision AREDS) 4,296 mcg-226 mg-90 mg capsule Take 1 tablet by mouth every 12 hours.    pantoprazole (ProtoNix) 40 mg EC tablet Take 1 tablet (40 mg) by mouth once daily for 21 days. Do not crush, chew, or split.     Allergies:  Nsaids (non-steroidal anti-inflammatory drug)     Patient Active Problem List   Diagnosis    Other chest pain    Bradycardia    Cataracts, both eyes    Chronic peripheral venous hypertension    Chronic renal insufficiency    CKD stage 3 due to type 2 diabetes mellitus (CMS/HCC)    Degenerative arthritis of cervical spine    Degeneration of intervertebral disc of lumbar region    Lumbar spondylosis    Glaucoma, bilateral    Chronic right shoulder pain    Essential hypertension    Type 2 diabetes mellitus with chronic kidney disease, without long-term current use of insulin (CMS/[facility])    Hyperparathyroidism, secondary (CMS/[facility])    Meniere's disease of right ear    Vitamin D deficiency    Hyperuricemia    Hyperlipidemia    Localized, primary osteoarthritis    Osteopenia    Urge and stress incontinence    Spinal stenosis of lumbar region     Past Surgical History:   Procedure Laterality Date    OTHER SURGICAL HISTORY  09/28/2021    Hip surgery    OTHER SURGICAL HISTORY  09/28/2021    Hysterectomy    OTHER SURGICAL HISTORY  08/18/2022     "Knee surgery    OTHER SURGICAL HISTORY  01/21/2022    Esophagogastroduodenoscopy    OTHER SURGICAL HISTORY  02/22/2022    Complete colonoscopy     Objective:  Vitals:    01/19/24 0803   BP: 120/70   Pulse: 79   Temp: 36 °C (96.8 °F)   Height:     1.626 m (5' 4\")  Weight: 89.9 kg (198 lb 3.2 oz)     Exam:  Gen: Vivacious woman in no distress.  HEENT: No scleral icterus.  Neck: No jugular venous distention or thyromegaly.  Lungs: Clear to auscultation, with no wheezes or rales.  Heart: Regular rhythm without extrasystoles, murmurs or gallops.  Abdomen: Obese but benign, with no organomegaly or masses.  Extremities: Intact distal pulses; no edema.  Neuro: No focal neurologic abnormalities.  Skin: No cutaneous lesions.    Impressions:  1.  Chronic hypertension, well-controlled on low-dose lisinopril.  2.  Type 2 diabetes, with diabetic nephropathy and stage III chronic kidney disease.  3.  Ménière's disease with intermittent vertigo.  4.  Sinus bradycardia noted during hospital stay in December 2023, some of which was felt to be due to atenolol therapy, which the patient had been taking at 12.5 mg p.o. daily.  The beta-blockers were discontinued.  By subsequent event monitoring, the patient had an average heart rate of 67 bpm with reasonable chronotropic competence.  There is no indication for permanent pacing.  5.  Brief nonsustained atrial tachycardia by event monitoring, not requiring therapy, though this could be a predecessor to future atrial fibrillation.  6.  Atypical chest discomfort during hospital stay, with numerous coronary artery disease risk factors.  A Lexiscan study is scheduled for next month for further evaluation.  7.  Other medical problems, including hyperlipidemia (on statins), degenerative joint and spine disease, glaucoma, hyperparathyroidism, and alleged peripheral vascular disease.    Recommendations:  1.  The patient will continue her current medical regimen.  2.  If her Lexiscan study is " abnormal, she will be referred to general cardiology or interventional cardiology for consideration of cardiac catheterization.  3.  She will follow-up with electrophysiology on a PRN basis, in case of any future syncope or any documentation of sustained atrial tachycardia or fibrillation.      Manolo Lacy MD

## 2024-01-24 ENCOUNTER — APPOINTMENT (OUTPATIENT)
Dept: RADIOLOGY | Facility: HOSPITAL | Age: 84
End: 2024-01-24
Payer: MEDICARE

## 2024-01-24 ENCOUNTER — APPOINTMENT (OUTPATIENT)
Dept: CARDIOLOGY | Facility: HOSPITAL | Age: 84
End: 2024-01-24
Payer: MEDICARE

## 2024-01-26 ENCOUNTER — PATIENT OUTREACH (OUTPATIENT)
Dept: CARDIOLOGY | Facility: CLINIC | Age: 84
End: 2024-01-26
Payer: MEDICARE

## 2024-01-26 NOTE — PROGRESS NOTES
Call placed regarding one month post discharge follow up call.  At time of outreach call the patient feels as if their condition has improved since initial visit with PCP or specialist.  Questions or concerns regarding recovery period addressed at this time.  Reviewed any PCP or specialists progress notes/labs/radiology reports if applicable and addressed any questions or concerns.     Patient has no further questions. Burns from holter have healed. Patient endorses that her belching has improved. Patient states she has her stress test next month

## 2024-01-29 ENCOUNTER — TELEMEDICINE (OUTPATIENT)
Dept: PHARMACY | Facility: HOSPITAL | Age: 84
End: 2024-01-29
Payer: MEDICARE

## 2024-01-29 DIAGNOSIS — N18.32 TYPE 2 DIABETES MELLITUS WITH STAGE 3B CHRONIC KIDNEY DISEASE, WITHOUT LONG-TERM CURRENT USE OF INSULIN (MULTI): ICD-10-CM

## 2024-01-29 DIAGNOSIS — E11.22 TYPE 2 DIABETES MELLITUS WITH STAGE 3B CHRONIC KIDNEY DISEASE, WITHOUT LONG-TERM CURRENT USE OF INSULIN (MULTI): ICD-10-CM

## 2024-01-29 ASSESSMENT — ENCOUNTER SYMPTOMS: DIABETIC ASSOCIATED SYMPTOMS: 0

## 2024-01-29 NOTE — ASSESSMENT & PLAN NOTE
Most recent A1c 7.4%. Slightly elevated, previous was 7.1%, likely due to steroid use over the last few months. Not currently on steroids.  On metformin 500 mg BID and Jardiance 25 mg daily. Doing well on Jardiance, no AE or complaints.  Difficulty affording Jardiance copay, will submit through  patient assistance for full coverage.  Patient has good general understanding of healthy diet.  Was doing water aerobics prior to covid pandemic, now walking when able for exercise. Encouraged restarting water aerobics.    Plan:  Continue all current pharmacotherapy. Submit Jardiance through  Patient assistance. Financial documents received.  Patient interested in continuous glucose monitoring. Will look into this for her but likely per medicare requirements would need to be on insulin.    PATIENT EDUCATION/DISCUSSION:  - Counseled patient on MOA, expectations, side effects, duration of therapy, contraindications, administration, and monitoring parameters  - Answered all patient questions and concerns

## 2024-01-29 NOTE — PROGRESS NOTES
Pharmacy Post-Discharge Visit  Lakshmi Trinh is a 83 y.o. female was referred to Clinical Pharmacy Team to complete a post-discharge medication optimization and monitoring visit.  The patient was referred for their Diabetes.    Admission Date: 12/12/23  Discharge Date: 12/13/23    Referring Provider: Misty Rincon MD    Subjective   Allergies   Allergen Reactions    Nsaids (Non-Steroidal Anti-Inflammatory Drug) Hives       [id number] - [place], [place] - [address] [facility]  [address] [facility] [address]  Phone: [phone] Fax: [phone]    [facility]  [address], [address]  [facility] [address]  Phone: [phone] Fax: [phone]      Social History     Social History Narrative    Not on file        Notable Medication changes following discharge:  Start: Jardiance 25 mg daily  Stop: atenolol  Change: none    Diabetes  She presents for her initial diabetic visit. She has type 2 diabetes mellitus. The initial diagnosis of diabetes was made 2 years ago. Her disease course has been stable. There are no hypoglycemic associated symptoms. There are no diabetic associated symptoms. There are no hypoglycemic complications. Diabetic complications include nephropathy. Risk factors for coronary artery disease include tobacco exposure, obesity, hypertension, diabetes mellitus and dyslipidemia. Current diabetic treatment includes diet and oral agent (dual therapy). She is compliant with treatment all of the time. Her weight is fluctuating minimally. She is following a generally healthy (potato chips) diet. Meal planning includes avoidance of concentrated sweets. Exercise: knee and hip replaced. Walking for exercise. Was doing water aerobics prior to pandemic. (Not monitoring at home.) Eye exam is current.     *Insert smartphrase [copdplat], [strokeplat], and/or [chfplat] if managing instead of HPI form*    Review of Systems    Medication System  Management:  Affordability/Accessibility: high copay with jardiance - looking into patient assistance  Adherence/Organization: none  Adverse Effects: none    Objective     There were no vitals taken for this visit.     LAB  Lab Results   Component Value Date    BILITOT 0.6 12/12/2023    CALCIUM 9.0 12/13/2023    CO2 24 12/13/2023     12/13/2023    CREATININE 1.38 (H) 12/13/2023    GLUCOSE 129 (H) 12/13/2023    ALKPHOS 84 12/12/2023    K 4.2 12/13/2023    PROT 6.4 12/12/2023     12/13/2023    AST 15 12/12/2023    ALT 16 12/12/2023    BUN 27 (H) 12/13/2023    ANIONGAP 14 12/13/2023    MG 1.99 12/12/2023    PHOS 4.3 06/05/2023    ALBUMIN 3.9 12/12/2023    LIPASE 53 12/12/2023    GFRF 33 (A) 09/14/2023     Lab Results   Component Value Date    TRIG 225 (H) 12/11/2023    CHOL 157 12/11/2023    LDLCALC 72 12/11/2023    HDL 40.5 12/11/2023     Lab Results   Component Value Date    HGBA1C 7.4 (A) 12/21/2023         Current Outpatient Medications on File Prior to Visit   Medication Sig Dispense Refill    allopurinol (Zyloprim) 300 mg tablet Take 1 tablet (300 mg) by mouth once daily.      bumetanide (Bumex) 0.5 mg tablet Take 1 tablet (0.5 mg) by mouth once daily.      calcitriol (Rocaltrol) 0.25 mcg capsule Take 1 capsule (0.25 mcg) by mouth. Take 2 capsules by mouth over the weekend and 1 capsule during the week      cholecalciferol (Vitamin D-3) 50 MCG (2000 UT) tablet Take 1 tablet (50 mcg) by mouth once daily.      latanoprost (Xalatan) 0.005 % ophthalmic solution Administer 1 drop into both eyes once daily at bedtime.      lisinopril 2.5 mg tablet TAKE ONE TABLET BY MOUTH EVERY DAY 90 tablet 0    metFORMIN (Glucophage) 500 mg tablet TAKE ONE TABLET BY MOUTH TWO TIMES A  tablet 0    nitroglycerin (Nitrostat) 0.4 mg SL tablet Place 1 tablet (0.4 mg) under the tongue every 5 minutes if needed for chest pain. May repeat every 5 minutes for up to 3 doses. 30 tablet 0    pantoprazole (ProtoNix) 40 mg EC  tablet Take 1 tablet (40 mg) by mouth once daily for 21 days. Do not crush, chew, or split. 21 tablet 0    simvastatin (Zocor) 40 mg tablet TAKE ONE TABLET BY MOUTH AT BEDTIME 90 tablet 0    timoloL maleate, PF, 0.5 % dropperette Administer 1 drop into both eyes twice a day.      traMADol (Ultram) 50 mg tablet Take by mouth.      vitamins A,C,E-zinc-copper (PreserVision AREDS) 4,296 mcg-226 mg-90 mg capsule Take 1 tablet by mouth every 12 hours.      [DISCONTINUED] empagliflozin (Jardiance) 25 mg Take 1 tablet (25 mg) by mouth once daily. 30 tablet 0     No current facility-administered medications on file prior to visit.        HISTORICAL PHARMACOTHERAPY  -Farxiga - intolerance    DRUG INTERATIONS  - no known drug interactions requiring a change in therapy  - Based on most recent eGFR, consider allopurinol dose reduction to 50 mg once daily.    Assessment/Plan   Problem List Items Addressed This Visit       Type 2 diabetes mellitus with chronic kidney disease, without long-term current use of insulin (CMS/[facility])     Most recent A1c 7.4%. Slightly elevated, previous was 7.1%, likely due to steroid use over the last few months. Not currently on steroids.  On metformin 500 mg BID and Jardiance 25 mg daily. Doing well on Jardiance, no AE or complaints.  Difficulty affording Jardiance copay, will submit through  patient assistance for full coverage.  Patient has good general understanding of healthy diet.  Was doing water aerobics prior to covid pandemic, now walking when able for exercise. Encouraged restarting water aerobics.    Plan:  Continue all current pharmacotherapy. Submit Jardiance through  Patient assistance. Financial documents received.  Patient interested in continuous glucose monitoring. Will look into this for her but likely per medicare requirements would need to be on insulin.    PATIENT EDUCATION/DISCUSSION:  - Counseled patient on MOA, expectations, side effects, duration of therapy,  contraindications, administration, and monitoring parameters  - Answered all patient questions and concerns             Relevant Medications    empagliflozin (Jardiance) 25 mg   Follow up: 3 weeks    Continue all meds under the continuation of care with the referring provider and clinical pharmacy team.    Rodrigo Stark PharmD     Verbal consent to manage patient's drug therapy was obtained from [the patient and/or an individual authorized to act on behalf of a patient]. They were informed they may decline to participate or withdraw from participation in pharmacy services at any time.

## 2024-02-01 PROCEDURE — RXMED WILLOW AMBULATORY MEDICATION CHARGE

## 2024-02-02 ENCOUNTER — HOSPITAL ENCOUNTER (OUTPATIENT)
Dept: RADIOLOGY | Facility: CLINIC | Age: 84
Discharge: HOME | End: 2024-02-02
Payer: MEDICARE

## 2024-02-02 ENCOUNTER — TELEPHONE (OUTPATIENT)
Dept: PRIMARY CARE | Facility: CLINIC | Age: 84
End: 2024-02-02
Payer: MEDICARE

## 2024-02-02 ENCOUNTER — HOSPITAL ENCOUNTER (OUTPATIENT)
Dept: CARDIOLOGY | Facility: CLINIC | Age: 84
Discharge: HOME | End: 2024-02-02
Payer: MEDICARE

## 2024-02-02 ENCOUNTER — PHARMACY VISIT (OUTPATIENT)
Dept: PHARMACY | Facility: CLINIC | Age: 84
End: 2024-02-02
Payer: MEDICARE

## 2024-02-02 DIAGNOSIS — E11.22 TYPE 2 DIABETES MELLITUS WITH STAGE 3B CHRONIC KIDNEY DISEASE, WITHOUT LONG-TERM CURRENT USE OF INSULIN (MULTI): ICD-10-CM

## 2024-02-02 DIAGNOSIS — N18.32 TYPE 2 DIABETES MELLITUS WITH STAGE 3B CHRONIC KIDNEY DISEASE, WITHOUT LONG-TERM CURRENT USE OF INSULIN (MULTI): ICD-10-CM

## 2024-02-02 DIAGNOSIS — R07.9 CHEST PAIN, UNSPECIFIED TYPE: ICD-10-CM

## 2024-02-02 DIAGNOSIS — R68.89 DECREASED EXERCISE TOLERANCE: ICD-10-CM

## 2024-02-02 DIAGNOSIS — E21.3 HYPERPARATHYROIDISM (MULTI): Primary | ICD-10-CM

## 2024-02-02 PROCEDURE — A9502 TC99M TETROFOSMIN: HCPCS | Performed by: FAMILY MEDICINE

## 2024-02-02 PROCEDURE — 2500000004 HC RX 250 GENERAL PHARMACY W/ HCPCS (ALT 636 FOR OP/ED): Performed by: FAMILY MEDICINE

## 2024-02-02 PROCEDURE — 93017 CV STRESS TEST TRACING ONLY: CPT

## 2024-02-02 PROCEDURE — 78452 HT MUSCLE IMAGE SPECT MULT: CPT

## 2024-02-02 PROCEDURE — 3430000001 HC RX 343 DIAGNOSTIC RADIOPHARMACEUTICALS: Performed by: FAMILY MEDICINE

## 2024-02-02 RX ORDER — REGADENOSON 0.08 MG/ML
0.4 INJECTION, SOLUTION INTRAVENOUS
Status: COMPLETED | OUTPATIENT
Start: 2024-02-02 | End: 2024-02-02

## 2024-02-02 RX ORDER — REGADENOSON 0.08 MG/ML
0.4 INJECTION, SOLUTION INTRAVENOUS ONCE
OUTPATIENT
Start: 2024-02-02 | End: 2024-02-02

## 2024-02-02 RX ADMIN — TETROFOSMIN 10.5 MILLICURIE: 0.23 INJECTION, POWDER, LYOPHILIZED, FOR SOLUTION INTRAVENOUS at 12:03

## 2024-02-02 RX ADMIN — REGADENOSON 0.4 MG: 0.08 INJECTION, SOLUTION INTRAVENOUS at 13:18

## 2024-02-02 RX ADMIN — TETROFOSMIN 34.9 MILLICURIE: 0.23 INJECTION, POWDER, LYOPHILIZED, FOR SOLUTION INTRAVENOUS at 13:14

## 2024-02-05 ENCOUNTER — TELEPHONE (OUTPATIENT)
Dept: PRIMARY CARE | Facility: CLINIC | Age: 84
End: 2024-02-05
Payer: MEDICARE

## 2024-02-05 NOTE — TELEPHONE ENCOUNTER
----- Message from Misty Rincon MD sent at 2/2/2024  4:40 PM EST -----  Stress test is negative for evidence of impaired blood flow to the heart muscle.  Left ventricular ejection fraction normal at 84%.  Follow-up if continued symptoms of chest pain

## 2024-02-05 NOTE — TELEPHONE ENCOUNTER
T/c to pt, spoke with pt, informed pt of stress test results, advised pt to give us a call back if she is still having chest pain

## 2024-02-13 LAB
ATRIAL RATE: 42 BPM
ATRIAL RATE: 43 BPM
P AXIS: 58 DEGREES
P AXIS: 70 DEGREES
P OFFSET: 161 MS
P OFFSET: 185 MS
P ONSET: 118 MS
P ONSET: 121 MS
PR INTERVAL: 176 MS
PR INTERVAL: 182 MS
Q ONSET: 209 MS
Q ONSET: 209 MS
QRS COUNT: 7 BEATS
QRS COUNT: 7 BEATS
QRS DURATION: 88 MS
QRS DURATION: 88 MS
QT INTERVAL: 482 MS
QT INTERVAL: 496 MS
QTC CALCULATION(BAZETT): 402 MS
QTC CALCULATION(BAZETT): 419 MS
QTC FREDERICIA: 428 MS
QTC FREDERICIA: 443 MS
R AXIS: -23 DEGREES
R AXIS: -27 DEGREES
T AXIS: 27 DEGREES
T AXIS: 38 DEGREES
T OFFSET: 450 MS
T OFFSET: 457 MS
VENTRICULAR RATE: 42 BPM
VENTRICULAR RATE: 43 BPM

## 2024-02-14 ENCOUNTER — OFFICE VISIT (OUTPATIENT)
Dept: PRIMARY CARE | Facility: CLINIC | Age: 84
End: 2024-02-14
Payer: MEDICARE

## 2024-02-14 DIAGNOSIS — M79.89 OTHER SPECIFIED SOFT TISSUE DISORDERS: ICD-10-CM

## 2024-02-14 DIAGNOSIS — I87.309 CHRONIC PERIPHERAL VENOUS HYPERTENSION: ICD-10-CM

## 2024-02-14 PROCEDURE — 93970 EXTREMITY STUDY: CPT | Performed by: INTERNAL MEDICINE

## 2024-02-14 NOTE — PROGRESS NOTES
Venous Duplex      Indications:  Limb pain  Leg Edema    Sonographer: Laura Macedo RVT    TECHNIQUE:  Venous Duplex ultrasound spectral Doppler wave modality was performed with the patient in the supine and upright positions to determine the status of the deep and superficial systems of the right and left lower extremity. The common femoral, femoral and popliteal veins of the deep venous system were imaged. The superficial system was imaged entirely to include, but was not limited to, the saphenous-femoral junction (SFJ) down the greater saphenous vein from the groin to the ankle, and the saphenous-popliteal junction (SPJ), if present, at the popliteal fossa down the small saphenous vein (SSV) to the ankle. As indicated, the cranial extensions of the SSV (CESSV), the anterior accessory GSV (AAGSV), and the posterior accessory GSV (PAGSV) were also evaluated, if present. All areas meeting the criteria for venous reflux (500 milliseconds or greater in the saphenous veins and principle branches, 350 milliseconds in perforators and 1000 milliseconds in the deep system) were confirmed with spectral Doppler analysis and confirmatory images were documented:     FINDINGS ARE THE FOLLOWING:    RIGHT LEG:  There is no evidence of thrombus in the interrogated segments of the deep or superficial venous system. There is no evidence of deep venous reflux.     GSV dimensions: 4.0mm junction   Proximal segment is not visualized   Mid segment is non compressible   Distal segment is not visualized  The mid Greater Saphenous Vein is non compressible, consistent with previous treatment    SSV dimensions: 1.9mm junction  1.3mm mid  There is <0.5 seconds of reflux in the Small Saphenous Vein      LEFT LEG:  There is no evidence of thrombus in the interrogated segments of the deep or superficial venous system. There is evidence of deep venous reflux in the Popliteal Vein.    GSV dimensions: 4.2mm junction  proximal segment is not  visualized   mid segment is non compressible   distal segment is non compressible  The mid to distal Greater Saphenous Vein is non compressible, consistent with previous treatment    SSV dimensions: 2.6mm junction  2.3mm mid  There is <0.5 seconds of reflux in the Small Saphenous Vein    There are multiple incompetent tributaries along the thigh and lower leg.    Trib1 dimensions: 3.4mm  Trib2 dimensions: 2.7mm  There is >0.5 seconds of reflux in the tributaries      CONCLUSIONS:  There is no evidence of thrombus in the interrogated segments of the deep or superficial venous system in both legs.  No evidence of deep venous reflux in the RT LE.  Evidence of deep venous reflux in the LT Popliteal Vein.  Few incompetent tributaries are seen in the LT LE below the knee as noted in the MAP and 1 tributary in the anterior lower one third of the calf more than 3 mm in diameter.    Discussions.  Discussed the findings with the patient and at this time patient has minimal symptoms.  Patient understands to continue the lifestyle modification and graduated compression stockings.  She will be followed up in 6 months time to have a reevaluation.  She understands if there is any change or concern to see us sooner.

## 2024-02-19 ENCOUNTER — TELEMEDICINE (OUTPATIENT)
Dept: PHARMACY | Facility: HOSPITAL | Age: 84
End: 2024-02-19
Payer: MEDICARE

## 2024-02-19 DIAGNOSIS — E11.22 TYPE 2 DIABETES MELLITUS WITH STAGE 3B CHRONIC KIDNEY DISEASE, WITHOUT LONG-TERM CURRENT USE OF INSULIN (MULTI): Primary | ICD-10-CM

## 2024-02-19 DIAGNOSIS — N18.32 TYPE 2 DIABETES MELLITUS WITH STAGE 3B CHRONIC KIDNEY DISEASE, WITHOUT LONG-TERM CURRENT USE OF INSULIN (MULTI): Primary | ICD-10-CM

## 2024-02-19 ASSESSMENT — ENCOUNTER SYMPTOMS: DIABETIC ASSOCIATED SYMPTOMS: 0

## 2024-02-19 NOTE — ASSESSMENT & PLAN NOTE
Most recent A1c 7.4%. Slightly elevated, previous was 7.1%   On metformin 500 mg BID and Jardiance 25 mg daily.  Now receiving Jardiance at no cost through the  Patient Assistance program. Approval date 2/1/24.  Physical activity is limited due to knee pain, was planning on getting back into water aerobics. Will see if chair yoga classes available at fitness center.    Plan:  Continue all current pharmacotherapy.    PATIENT EDUCATION/DISCUSSION:  - Counseled patient on MOA, expectations, side effects, duration of therapy, contraindications, administration, and monitoring parameters  - Answered all patient questions and concerns

## 2024-02-19 NOTE — PROGRESS NOTES
Pharmacy Post-Discharge Visit  Lakshmi Trinh is a 83 y.o. female was referred to Clinical Pharmacy Team to complete a post-discharge medication optimization and monitoring visit.  The patient was referred for their Diabetes.      Referring Provider: Misty Rincon MD    Subjective   Allergies   Allergen Reactions    Nsaids (Non-Steroidal Anti-Inflammatory Drug) Hives       [id number] - [place], [place] - [address] [facility]  [address] [facility] [address]  Phone: [phone] Fax: [phone]    [facility]  [address], [address]  [facility] [address]  Phone: [phone] Fax: [phone]      Social History     Social History Narrative    Not on file          Diabetes  She presents for her follow-up diabetic visit. She has type 2 diabetes mellitus. Her disease course has been stable. There are no hypoglycemic associated symptoms. There are no diabetic associated symptoms. There are no hypoglycemic complications. Diabetic complications include nephropathy. Risk factors for coronary artery disease include hypertension, dyslipidemia, diabetes mellitus, obesity, sedentary lifestyle and tobacco exposure. Current diabetic treatment includes oral agent (dual therapy). She is compliant with treatment all of the time. Her weight is stable. Meal planning includes avoidance of concentrated sweets. Exercise: currently no dedicated time for exercise. (Not testing BG) An ACE inhibitor/angiotensin II receptor blocker is being taken.       Review of Systems    Medication System Management:  Affordability/Accessibility: Enrolled in  PAP  Adherence/Organization: none  Adverse Effects: none    Objective     There were no vitals taken for this visit.     LAB  Lab Results   Component Value Date    BILITOT 0.6 12/12/2023    CALCIUM 9.0 12/13/2023    CO2 24 12/13/2023     12/13/2023    CREATININE 1.38 (H) 12/13/2023    GLUCOSE 129 (H) 12/13/2023    ALKPHOS 84 12/12/2023    K 4.2 12/13/2023     PROT 6.4 12/12/2023     12/13/2023    AST 15 12/12/2023    ALT 16 12/12/2023    BUN 27 (H) 12/13/2023    ANIONGAP 14 12/13/2023    MG 1.99 12/12/2023    PHOS 4.3 06/05/2023    ALBUMIN 3.9 12/12/2023    LIPASE 53 12/12/2023    GFRF 33 (A) 09/14/2023     Lab Results   Component Value Date    TRIG 225 (H) 12/11/2023    CHOL 157 12/11/2023    LDLCALC 72 12/11/2023    HDL 40.5 12/11/2023     Lab Results   Component Value Date    HGBA1C 7.4 (A) 12/21/2023         Current Outpatient Medications on File Prior to Visit   Medication Sig Dispense Refill    allopurinol (Zyloprim) 300 mg tablet Take 1 tablet (300 mg) by mouth once daily.      bumetanide (Bumex) 0.5 mg tablet Take 1 tablet (0.5 mg) by mouth once daily.      calcitriol (Rocaltrol) 0.25 mcg capsule Take 1 capsule (0.25 mcg) by mouth. Take 2 capsules by mouth over the weekend and 1 capsule during the week      cholecalciferol (Vitamin D-3) 50 MCG (2000 UT) tablet Take 1 tablet (50 mcg) by mouth once daily.      empagliflozin (Jardiance) 25 mg Take 1 tablet (25 mg) by mouth once daily. 30 tablet 5    latanoprost (Xalatan) 0.005 % ophthalmic solution Administer 1 drop into both eyes once daily at bedtime.      lisinopril 2.5 mg tablet TAKE ONE TABLET BY MOUTH EVERY DAY 90 tablet 0    metFORMIN (Glucophage) 500 mg tablet TAKE ONE TABLET BY MOUTH TWO TIMES A  tablet 0    nitroglycerin (Nitrostat) 0.4 mg SL tablet Place 1 tablet (0.4 mg) under the tongue every 5 minutes if needed for chest pain. May repeat every 5 minutes for up to 3 doses. 30 tablet 0    pantoprazole (ProtoNix) 40 mg EC tablet Take 1 tablet (40 mg) by mouth once daily for 21 days. Do not crush, chew, or split. 21 tablet 0    simvastatin (Zocor) 40 mg tablet TAKE ONE TABLET BY MOUTH AT BEDTIME 90 tablet 0    timoloL maleate, PF, 0.5 % dropperette Administer 1 drop into both eyes twice a day.      traMADol (Ultram) 50 mg tablet Take by mouth.      vitamins A,C,E-zinc-copper (PreserVision  AREDS) 4,296 mcg-226 mg-90 mg capsule Take 1 tablet by mouth every 12 hours.       No current facility-administered medications on file prior to visit.        HISTORICAL PHARMACOTHERAPY  -no relevant history    DRUG INTERATIONS  - no known significant drug interactions requiring a change in therapy    Assessment/Plan   Problem List Items Addressed This Visit       Type 2 diabetes mellitus with chronic kidney disease, without long-term current use of insulin (CMS/[facility]) - Primary     Most recent A1c 7.4%. Slightly elevated, previous was 7.1%   On metformin 500 mg BID and Jardiance 25 mg daily.  Now receiving Jardiance at no cost through the  Patient Assistance program. Approval date 2/1/24.  Physical activity is limited due to knee pain, was planning on getting back into water aerobics. Will see if chair yoga classes available at fitness center.    Plan:  Continue all current pharmacotherapy.    PATIENT EDUCATION/DISCUSSION:  - Counseled patient on MOA, expectations, side effects, duration of therapy, contraindications, administration, and monitoring parameters  - Answered all patient questions and concerns        Follow up: 3 months      Continue all meds under the continuation of care with the referring provider and clinical pharmacy team.    Rodrigo Stark PharmD     Verbal consent to manage patient's drug therapy was obtained from [the patient and/or an individual authorized to act on behalf of a patient]. They were informed they may decline to participate or withdraw from participation in pharmacy services at any time.

## 2024-02-28 ENCOUNTER — PATIENT OUTREACH (OUTPATIENT)
Dept: CARDIOLOGY | Facility: CLINIC | Age: 84
End: 2024-02-28
Payer: MEDICARE

## 2024-03-11 PROCEDURE — RXMED WILLOW AMBULATORY MEDICATION CHARGE

## 2024-03-12 ENCOUNTER — PHARMACY VISIT (OUTPATIENT)
Dept: PHARMACY | Facility: CLINIC | Age: 84
End: 2024-03-12
Payer: MEDICARE

## 2024-03-12 DIAGNOSIS — N18.30 TYPE 2 DIABETES MELLITUS WITH STAGE 3 CHRONIC KIDNEY DISEASE, WITHOUT LONG-TERM CURRENT USE OF INSULIN, UNSPECIFIED WHETHER STAGE 3A OR 3B CKD (MULTI): ICD-10-CM

## 2024-03-12 DIAGNOSIS — I10 HYPERTENSION, UNSPECIFIED TYPE: ICD-10-CM

## 2024-03-12 DIAGNOSIS — E78.5 HYPERLIPIDEMIA, UNSPECIFIED HYPERLIPIDEMIA TYPE: ICD-10-CM

## 2024-03-12 DIAGNOSIS — E11.22 TYPE 2 DIABETES MELLITUS WITH STAGE 3 CHRONIC KIDNEY DISEASE, WITHOUT LONG-TERM CURRENT USE OF INSULIN, UNSPECIFIED WHETHER STAGE 3A OR 3B CKD (MULTI): ICD-10-CM

## 2024-03-12 RX ORDER — LISINOPRIL 2.5 MG/1
2.5 TABLET ORAL DAILY
Qty: 90 TABLET | Refills: 0 | Status: SHIPPED | OUTPATIENT
Start: 2024-03-12 | End: 2024-03-27 | Stop reason: SDUPTHER

## 2024-03-12 RX ORDER — SIMVASTATIN 40 MG/1
40 TABLET, FILM COATED ORAL NIGHTLY
Qty: 90 TABLET | Refills: 0 | Status: SHIPPED | OUTPATIENT
Start: 2024-03-12 | End: 2024-03-27 | Stop reason: SDUPTHER

## 2024-03-25 ENCOUNTER — LAB (OUTPATIENT)
Dept: LAB | Facility: LAB | Age: 84
End: 2024-03-25
Payer: MEDICARE

## 2024-03-25 DIAGNOSIS — E11.22 TYPE 2 DIABETES MELLITUS WITH STAGE 3B CHRONIC KIDNEY DISEASE, WITHOUT LONG-TERM CURRENT USE OF INSULIN (MULTI): ICD-10-CM

## 2024-03-25 DIAGNOSIS — N18.32 TYPE 2 DIABETES MELLITUS WITH STAGE 3B CHRONIC KIDNEY DISEASE, WITHOUT LONG-TERM CURRENT USE OF INSULIN (MULTI): ICD-10-CM

## 2024-03-25 DIAGNOSIS — E21.3 HYPERPARATHYROIDISM (MULTI): ICD-10-CM

## 2024-03-25 DIAGNOSIS — M79.18 MYALGIA, PELVIC REGION AND THIGH: ICD-10-CM

## 2024-03-25 LAB
25(OH)D3 SERPL-MCNC: 47 NG/ML (ref 30–100)
ANION GAP SERPL CALC-SCNC: 15 MMOL/L (ref 10–20)
BASOPHILS # BLD AUTO: 0.06 X10*3/UL (ref 0–0.1)
BASOPHILS NFR BLD AUTO: 0.7 %
BUN SERPL-MCNC: 24 MG/DL (ref 6–23)
CALCIUM SERPL-MCNC: 9.7 MG/DL (ref 8.6–10.3)
CHLORIDE SERPL-SCNC: 105 MMOL/L (ref 98–107)
CK SERPL-CCNC: 26 U/L (ref 0–215)
CO2 SERPL-SCNC: 26 MMOL/L (ref 21–32)
CREAT SERPL-MCNC: 1.31 MG/DL (ref 0.5–1.05)
CRP SERPL-MCNC: 0.52 MG/DL
EGFRCR SERPLBLD CKD-EPI 2021: 41 ML/MIN/1.73M*2
EOSINOPHIL # BLD AUTO: 0.32 X10*3/UL (ref 0–0.4)
EOSINOPHIL NFR BLD AUTO: 3.9 %
ERYTHROCYTE [DISTWIDTH] IN BLOOD BY AUTOMATED COUNT: 14.2 % (ref 11.5–14.5)
ERYTHROCYTE [SEDIMENTATION RATE] IN BLOOD BY WESTERGREN METHOD: 22 MM/H (ref 0–30)
GLUCOSE SERPL-MCNC: 148 MG/DL (ref 74–99)
HCT VFR BLD AUTO: 47.7 % (ref 36–46)
HGB BLD-MCNC: 15.3 G/DL (ref 12–16)
IMM GRANULOCYTES # BLD AUTO: 0.02 X10*3/UL (ref 0–0.5)
IMM GRANULOCYTES NFR BLD AUTO: 0.2 % (ref 0–0.9)
LYMPHOCYTES # BLD AUTO: 3.19 X10*3/UL (ref 0.8–3)
LYMPHOCYTES NFR BLD AUTO: 38.9 %
MCH RBC QN AUTO: 31.5 PG (ref 26–34)
MCHC RBC AUTO-ENTMCNC: 32.1 G/DL (ref 32–36)
MCV RBC AUTO: 98 FL (ref 80–100)
MONOCYTES # BLD AUTO: 0.64 X10*3/UL (ref 0.05–0.8)
MONOCYTES NFR BLD AUTO: 7.8 %
NEUTROPHILS # BLD AUTO: 3.96 X10*3/UL (ref 1.6–5.5)
NEUTROPHILS NFR BLD AUTO: 48.5 %
NRBC BLD-RTO: 0 /100 WBCS (ref 0–0)
PLATELET # BLD AUTO: 220 X10*3/UL (ref 150–450)
POTASSIUM SERPL-SCNC: 4.5 MMOL/L (ref 3.5–5.3)
PTH-INTACT SERPL-MCNC: 40.4 PG/ML (ref 18.5–88)
RBC # BLD AUTO: 4.85 X10*6/UL (ref 4–5.2)
SODIUM SERPL-SCNC: 141 MMOL/L (ref 136–145)
WBC # BLD AUTO: 8.2 X10*3/UL (ref 4.4–11.3)

## 2024-03-25 PROCEDURE — 80048 BASIC METABOLIC PNL TOTAL CA: CPT

## 2024-03-25 PROCEDURE — 83970 ASSAY OF PARATHORMONE: CPT

## 2024-03-25 PROCEDURE — 83036 HEMOGLOBIN GLYCOSYLATED A1C: CPT

## 2024-03-25 PROCEDURE — 82550 ASSAY OF CK (CPK): CPT

## 2024-03-25 PROCEDURE — 82306 VITAMIN D 25 HYDROXY: CPT

## 2024-03-25 PROCEDURE — 85652 RBC SED RATE AUTOMATED: CPT

## 2024-03-25 PROCEDURE — 85025 COMPLETE CBC W/AUTO DIFF WBC: CPT

## 2024-03-25 PROCEDURE — 86140 C-REACTIVE PROTEIN: CPT

## 2024-03-25 PROCEDURE — 36415 COLL VENOUS BLD VENIPUNCTURE: CPT

## 2024-03-26 ENCOUNTER — TELEPHONE (OUTPATIENT)
Dept: PRIMARY CARE | Facility: CLINIC | Age: 84
End: 2024-03-26
Payer: MEDICARE

## 2024-03-26 NOTE — TELEPHONE ENCOUNTER
----- Message from Misty Rincon MD sent at 3/26/2024  8:51 AM EDT -----  Keep appt to review, overall no anemia, neg inflammatory markers, neg muscle enzyme

## 2024-03-27 ENCOUNTER — OFFICE VISIT (OUTPATIENT)
Dept: PRIMARY CARE | Facility: CLINIC | Age: 84
End: 2024-03-27
Payer: MEDICARE

## 2024-03-27 ENCOUNTER — OFFICE VISIT (OUTPATIENT)
Dept: NEPHROLOGY | Facility: CLINIC | Age: 84
End: 2024-03-27
Payer: MEDICARE

## 2024-03-27 VITALS
HEART RATE: 76 BPM | WEIGHT: 193 LBS | SYSTOLIC BLOOD PRESSURE: 112 MMHG | HEIGHT: 64 IN | BODY MASS INDEX: 32.95 KG/M2 | DIASTOLIC BLOOD PRESSURE: 62 MMHG | TEMPERATURE: 97.2 F

## 2024-03-27 VITALS
WEIGHT: 193 LBS | DIASTOLIC BLOOD PRESSURE: 77 MMHG | HEART RATE: 76 BPM | BODY MASS INDEX: 32.95 KG/M2 | SYSTOLIC BLOOD PRESSURE: 122 MMHG | HEIGHT: 64 IN

## 2024-03-27 DIAGNOSIS — E11.22 TYPE 2 DIABETES MELLITUS WITH STAGE 3 CHRONIC KIDNEY DISEASE, WITHOUT LONG-TERM CURRENT USE OF INSULIN, UNSPECIFIED WHETHER STAGE 3A OR 3B CKD (MULTI): ICD-10-CM

## 2024-03-27 DIAGNOSIS — N18.30 TYPE 2 DIABETES MELLITUS WITH STAGE 3 CHRONIC KIDNEY DISEASE, WITHOUT LONG-TERM CURRENT USE OF INSULIN, UNSPECIFIED WHETHER STAGE 3A OR 3B CKD (MULTI): ICD-10-CM

## 2024-03-27 DIAGNOSIS — E11.22 TYPE 2 DIABETES MELLITUS WITH STAGE 3B CHRONIC KIDNEY DISEASE, WITHOUT LONG-TERM CURRENT USE OF INSULIN (MULTI): ICD-10-CM

## 2024-03-27 DIAGNOSIS — I10 ESSENTIAL HYPERTENSION: ICD-10-CM

## 2024-03-27 DIAGNOSIS — M85.80 OSTEOPENIA, UNSPECIFIED LOCATION: ICD-10-CM

## 2024-03-27 DIAGNOSIS — Z23 NEED FOR VACCINATION: ICD-10-CM

## 2024-03-27 DIAGNOSIS — Z12.12 SCREENING FOR COLORECTAL CANCER: ICD-10-CM

## 2024-03-27 DIAGNOSIS — E78.5 HYPERLIPIDEMIA, UNSPECIFIED HYPERLIPIDEMIA TYPE: ICD-10-CM

## 2024-03-27 DIAGNOSIS — N18.30 CKD STAGE 3 DUE TO TYPE 2 DIABETES MELLITUS (MULTI): ICD-10-CM

## 2024-03-27 DIAGNOSIS — Z00.00 ROUTINE GENERAL MEDICAL EXAMINATION AT HEALTH CARE FACILITY: Primary | ICD-10-CM

## 2024-03-27 DIAGNOSIS — E11.22 CKD STAGE 3 DUE TO TYPE 2 DIABETES MELLITUS (MULTI): ICD-10-CM

## 2024-03-27 DIAGNOSIS — Z71.89 ADVANCE DIRECTIVE DISCUSSED WITH PATIENT: ICD-10-CM

## 2024-03-27 DIAGNOSIS — B35.1 ONYCHOMYCOSIS: ICD-10-CM

## 2024-03-27 DIAGNOSIS — N18.31 STAGE 3A CHRONIC KIDNEY DISEASE (MULTI): Primary | ICD-10-CM

## 2024-03-27 DIAGNOSIS — E78.2 MIXED HYPERLIPIDEMIA: ICD-10-CM

## 2024-03-27 DIAGNOSIS — Z13.89 SCREENING FOR MULTIPLE CONDITIONS: ICD-10-CM

## 2024-03-27 DIAGNOSIS — Z12.11 SCREENING FOR COLORECTAL CANCER: ICD-10-CM

## 2024-03-27 DIAGNOSIS — E21.3 HYPERPARATHYROIDISM (MULTI): ICD-10-CM

## 2024-03-27 DIAGNOSIS — I10 ESSENTIAL HYPERTENSION: Chronic | ICD-10-CM

## 2024-03-27 DIAGNOSIS — N18.32 TYPE 2 DIABETES MELLITUS WITH STAGE 3B CHRONIC KIDNEY DISEASE, WITHOUT LONG-TERM CURRENT USE OF INSULIN (MULTI): ICD-10-CM

## 2024-03-27 DIAGNOSIS — I10 HYPERTENSION, UNSPECIFIED TYPE: ICD-10-CM

## 2024-03-27 PROBLEM — H40.9 GLAUCOMA: Status: ACTIVE | Noted: 2022-07-14

## 2024-03-27 LAB
EST. AVERAGE GLUCOSE BLD GHB EST-MCNC: 160 MG/DL
HBA1C MFR BLD: 7.2 %

## 2024-03-27 PROCEDURE — 1159F MED LIST DOCD IN RCRD: CPT | Performed by: INTERNAL MEDICINE

## 2024-03-27 PROCEDURE — G0439 PPPS, SUBSEQ VISIT: HCPCS | Performed by: FAMILY MEDICINE

## 2024-03-27 PROCEDURE — 1036F TOBACCO NON-USER: CPT | Performed by: INTERNAL MEDICINE

## 2024-03-27 PROCEDURE — 99213 OFFICE O/P EST LOW 20 MIN: CPT | Performed by: INTERNAL MEDICINE

## 2024-03-27 PROCEDURE — 3078F DIAST BP <80 MM HG: CPT | Performed by: INTERNAL MEDICINE

## 2024-03-27 PROCEDURE — 3078F DIAST BP <80 MM HG: CPT | Performed by: FAMILY MEDICINE

## 2024-03-27 PROCEDURE — 3074F SYST BP LT 130 MM HG: CPT | Performed by: FAMILY MEDICINE

## 2024-03-27 PROCEDURE — 1170F FXNL STATUS ASSESSED: CPT | Performed by: FAMILY MEDICINE

## 2024-03-27 PROCEDURE — 1159F MED LIST DOCD IN RCRD: CPT | Performed by: FAMILY MEDICINE

## 2024-03-27 PROCEDURE — 3074F SYST BP LT 130 MM HG: CPT | Performed by: INTERNAL MEDICINE

## 2024-03-27 PROCEDURE — 1160F RVW MEDS BY RX/DR IN RCRD: CPT | Performed by: FAMILY MEDICINE

## 2024-03-27 PROCEDURE — 1157F ADVNC CARE PLAN IN RCRD: CPT | Performed by: FAMILY MEDICINE

## 2024-03-27 PROCEDURE — 1157F ADVNC CARE PLAN IN RCRD: CPT | Performed by: INTERNAL MEDICINE

## 2024-03-27 PROCEDURE — 99214 OFFICE O/P EST MOD 30 MIN: CPT | Performed by: FAMILY MEDICINE

## 2024-03-27 PROCEDURE — 1123F ACP DISCUSS/DSCN MKR DOCD: CPT | Performed by: FAMILY MEDICINE

## 2024-03-27 RX ORDER — LISINOPRIL 2.5 MG/1
2.5 TABLET ORAL DAILY
Qty: 90 TABLET | Refills: 1 | Status: SHIPPED | OUTPATIENT
Start: 2024-03-27

## 2024-03-27 RX ORDER — ALLOPURINOL 300 MG/1
300 TABLET ORAL DAILY
Qty: 90 TABLET | Refills: 3 | Status: SHIPPED | OUTPATIENT
Start: 2024-03-27 | End: 2025-03-27

## 2024-03-27 RX ORDER — METFORMIN HYDROCHLORIDE 500 MG/1
500 TABLET ORAL 2 TIMES DAILY
Qty: 180 TABLET | Refills: 1 | Status: SHIPPED | OUTPATIENT
Start: 2024-03-27

## 2024-03-27 RX ORDER — SIMVASTATIN 40 MG/1
40 TABLET, FILM COATED ORAL NIGHTLY
Qty: 90 TABLET | Refills: 1 | Status: SHIPPED | OUTPATIENT
Start: 2024-03-27

## 2024-03-27 RX ORDER — CALCITRIOL 0.25 UG/1
0.25 CAPSULE ORAL DAILY
Qty: 90 CAPSULE | Refills: 3 | Status: SHIPPED | OUTPATIENT
Start: 2024-03-27 | End: 2025-03-27

## 2024-03-27 ASSESSMENT — ACTIVITIES OF DAILY LIVING (ADL)
MANAGING_FINANCES: INDEPENDENT
DRESSING: INDEPENDENT
DOING_HOUSEWORK: INDEPENDENT
TAKING_MEDICATION: INDEPENDENT
BATHING: INDEPENDENT
GROCERY_SHOPPING: INDEPENDENT

## 2024-03-27 ASSESSMENT — PATIENT HEALTH QUESTIONNAIRE - PHQ9
2. FEELING DOWN, DEPRESSED OR HOPELESS: NOT AT ALL
SUM OF ALL RESPONSES TO PHQ9 QUESTIONS 1 AND 2: 0
1. LITTLE INTEREST OR PLEASURE IN DOING THINGS: NOT AT ALL

## 2024-03-27 NOTE — PROGRESS NOTES
Lakshmi Trinh   83 y.o.    @WT@  N/Room: [id number]/Room/bed info not found    Subjective:   The patient is being seen for a routine clinic follow-up of chronic kidney disease. Recently, the disease has been stable. Disease complications:  No hyperkalemia, no hypocalcemia, no hyperphosphatemia, no metabolic acidosis, no coagulopathy, no uremic encephalopathy, no neuropathy and no renal osteodystrophy. The patient is currently asymptomatic. No associated symptoms are reported.       Meds:   Current Outpatient Medications   Medication Sig Dispense Refill    allopurinol (Zyloprim) 300 mg tablet Take 1 tablet (300 mg) by mouth once daily.      bumetanide (Bumex) 0.5 mg tablet Take 1 tablet (0.5 mg) by mouth once daily.      calcitriol (Rocaltrol) 0.25 mcg capsule Take 1 capsule (0.25 mcg) by mouth. Take 2 capsules by mouth over the weekend and 1 capsule during the week      cholecalciferol (Vitamin D-3) 50 MCG (2000 UT) tablet Take 1 tablet (50 mcg) by mouth once daily.      empagliflozin (Jardiance) 25 mg Take 1 tablet (25 mg) by mouth once daily. 30 tablet 5    latanoprost (Xalatan) 0.005 % ophthalmic solution Administer 1 drop into both eyes once daily at bedtime.      lisinopril 2.5 mg tablet Take 1 tablet (2.5 mg) by mouth once daily. 90 tablet 0    metFORMIN (Glucophage) 500 mg tablet TAKE ONE TABLET BY MOUTH TWO TIMES A  tablet 0    nitroglycerin (Nitrostat) 0.4 mg SL tablet Place 1 tablet (0.4 mg) under the tongue every 5 minutes if needed for chest pain. May repeat every 5 minutes for up to 3 doses. 30 tablet 0    pantoprazole (ProtoNix) 40 mg EC tablet Take 1 tablet (40 mg) by mouth once daily for 21 days. Do not crush, chew, or split. 21 tablet 0    simvastatin (Zocor) 40 mg tablet Take 1 tablet (40 mg) by mouth once daily at bedtime. 90 tablet 0    timoloL maleate, PF, 0.5 % dropperette Administer 1 drop into both eyes twice a day.      traMADol (Ultram) 50 mg tablet Take by mouth.      vitamins  A,C,E-zinc-copper (PreserVision AREDS) 4,296 mcg-226 mg-90 mg capsule Take 1 tablet by mouth every 12 hours.       No current facility-administered medications for this visit.          ROS:  The patient is awake and oriented. No dizziness or lightheadedness. No chills and no fever. No headaches. No nausea and no vomiting. No shortness of breath. No cough. No sputum. No chest pain. No chest tightness. No abdominal pain. No diarrhea and no constipation. No hematemesis or hemoptysis. No hematuria. No rectal bleeding. No melena. No epistaxis. No urinary symptoms. No flank pain. No leg edema. No leg pain. No weakness. No itching. Overall, the rest of the review of systems is also negative.  12 point review of systems otherwise negative as stated in HPI.        Physical Examination:        Vitals:    03/27/24 0943   BP: 122/77   Pulse: 76     General: The patient is awake, oriented, and is not in any distress.  Head and Neck: Normocephalic. No periorbital edema.  Eyes: non-icteric  Respiratory: Symmetric air entry. Symmetric chest expansion.No respiratory distress.  Cardiovascular: Symmetric peripheral pulses.  Skin: No maculopapular rash.  Abdomen: soft, nt/nd  Musculoskeletal: No peripheral edema in both left and right upper extremities.  No edema in either left or right lower extremities.  Neuro Exam: Speech is fluent. Moves extremities.    Imaging:  === 01/02/24 ===    US RIGHT UPPER QUADRANT    - Impression -  Incomplete visualization of the pancreas. Otherwise unremarkable.    Signed by: Enrique Hutchinson 1/2/2024 2:44 PM  Dictation workstation:   [id number]       Blood Labs:  No results found for this or any previous visit (from the past 24 hour(s)).   Lab Results   Component Value Date    PTH 40.4 03/25/2024    PROTUR 5 06/05/2023    PHOS 4.3 06/05/2023      Lab Results   Component Value Date    GLUCOSE 148 (H) 03/25/2024    CALCIUM 9.7 03/25/2024     03/25/2024    K 4.5 03/25/2024    CO2 26 03/25/2024      03/25/2024    BUN 24 (H) 03/25/2024    CREATININE 1.31 (H) 03/25/2024         Assessment and Plan:  1. Chronic kidney disease stage III. Stable kidney function. Cr level is 1.31. Stable kidney function. Electrolytes are normal. Volume status is good. Blood pressure is under control.     2. Secondary hyperparathyroidism. On calcitriol.     3. Hypertension. Blood pressure is controlled. Continue the current medications.     4. Hyperuricemia. She is on allopurinol.      5. Dyslipidemia. The patient is on a statin and she is tolerating statin well.     6. Diabetes. No proteinuria based on spot urine protein creatinine ratio.     She will be seen in my office in about 6 months for followup.           Jose Juan Dooley MD  Senior Attending Physician  Director of Onco-Nephrology Program  Division of Nephrology & Hypertension  [facility]

## 2024-03-27 NOTE — PROGRESS NOTES
"Subjective   Reason for Visit: Lakshmi Trinh is an 83 y.o. female here for a Medicare Wellness visit.     Past Medical, Surgical, and Family History reviewed and updated in chart.         HPI  Here for annual wellness visit and medication follow-up.  1.  Type 2 diabetes-Compliant with metformin and Jardiance.  Eye exam up-to-date.  No recent foot exam.  Denies symptoms of hypoglycemia.  2.  Hyperlipidemia-stable on statin therapy.  Denies chest pains, palpitations, dyspnea on exertion  3.  CKD-stable, following with nephrology.  Patient Care Team:  Misty Rincon MD as PCP - General  Misty Rincon MD as PCP - [employer] Advantage PCP  Jose Juan Dooley MD as Consulting Physician (Nephrology)  Safia Sanchez MD as Referring Physician (Ophthalmology)  Helio Gan MD as Consulting Physician (Orthopaedic Surgery)  Yuliya Rojas MD MPH as Consulting Physician (Urology)     Review of Systems   Constitutional:  Negative for fatigue.   Respiratory:  Negative for cough, chest tightness and shortness of breath.    Cardiovascular:  Negative for chest pain, palpitations and leg swelling.   Gastrointestinal:  Negative for abdominal pain.   Neurological:  Negative for dizziness and headaches.       Objective   Vitals:  /62 (BP Location: Left arm, Patient Position: Sitting)   Pulse 76   Temp 36.2 °C (97.2 °F)   Ht 1.626 m (5' 4\")   Wt 87.5 kg (193 lb)   BMI 33.13 kg/m²       Physical Exam  Vitals reviewed.   Constitutional:       General: She is not in acute distress.     Appearance: Normal appearance. She is obese.   HENT:      Head: Normocephalic and atraumatic.      Right Ear: Tympanic membrane and ear canal normal.      Left Ear: Tympanic membrane and ear canal normal.      Nose: Nose normal. No congestion or rhinorrhea.      Mouth/Throat:      Mouth: Mucous membranes are moist.      Pharynx: Oropharynx is clear.   Eyes:      Extraocular Movements: Extraocular movements intact.      " Conjunctiva/sclera: Conjunctivae normal.      Pupils: Pupils are equal, round, and reactive to light.   Cardiovascular:      Rate and Rhythm: Normal rate and regular rhythm.      Pulses: Normal pulses.      Heart sounds: Normal heart sounds. No murmur heard.  Pulmonary:      Effort: Pulmonary effort is normal. No respiratory distress.      Breath sounds: Normal breath sounds.   Abdominal:      General: Abdomen is flat. Bowel sounds are normal.      Palpations: Abdomen is soft.      Tenderness: There is no abdominal tenderness.   Musculoskeletal:         General: Normal range of motion.      Cervical back: Normal range of motion and neck supple.   Lymphadenopathy:      Cervical: No cervical adenopathy.   Skin:     General: Skin is warm and dry.      Comments: Thickened toenails with subungual debris bilaterally.   Neurological:      General: No focal deficit present.      Mental Status: She is alert and oriented to person, place, and time.   Psychiatric:         Mood and Affect: Mood normal.         Thought Content: Thought content normal.         Assessment/Plan   Problem List Items Addressed This Visit       CKD stage 3 due to type 2 diabetes mellitus (CMS/HCC)    Relevant Orders    CBC and Auto Differential    Essential hypertension (Chronic)    Overview     -Stable on current regimen  -continue to monitor       Relevant Orders    Lipid Panel    Type 2 diabetes mellitus with chronic kidney disease, without long-term current use of insulin (CMS/HCC)    Overview     Continue metformin and Jardiance.  Continue efforts at healthy food choices.  Continue routine eye exam  Referral to podiatry for footcare       Relevant Medications    lisinopril 2.5 mg tablet    metFORMIN (Glucophage) 500 mg tablet    Other Relevant Orders    Hemoglobin A1C (Completed)    Hepatic function panel    Hemoglobin A1C    Referral to Podiatry    Hyperlipidemia    Overview     Formatting of this note might be different from the original. Hx of  HLP , c/w zocor         Relevant Medications    simvastatin (Zocor) 40 mg tablet    Other Relevant Orders    Hepatic function panel    Lipid Panel    Osteopenia  Encouraged weightbearing exercise, calcium plus vitamin D supplementation     Other Visit Diagnoses       Routine general medical examination at health care facility    -  Primary    Relevant Orders    1 Year Follow Up In Primary Care - Wellness Exam    Need for vaccination      Schedule Shingrix vaccine at pharmacy    Screening for colorectal cancer        Relevant Orders    Referral to Gastroenterology    Hypertension, unspecified type        Relevant Medications    lisinopril 2.5 mg tablet    Onychomycosis        Relevant Orders    Referral to Podiatry   Advance directive discussed with patient  Updated healthcare agent  Screening for multiple condition  Depression screening completed using the PHQ-2 questions with results documented in the chart/encounter  (See Rooming Screenings for documentation)  Results were reviewed and discussed with Lakshmi Trinh.

## 2024-03-28 ENCOUNTER — TELEPHONE (OUTPATIENT)
Dept: PRIMARY CARE | Facility: CLINIC | Age: 84
End: 2024-03-28
Payer: MEDICARE

## 2024-03-28 NOTE — TELEPHONE ENCOUNTER
----- Message from Misty Rincon MD sent at 3/28/2024  7:51 AM EDT -----  A1c is stable at 7.2  Continue current dm meds

## 2024-03-28 NOTE — TELEPHONE ENCOUNTER
T/c to pt, spoke with pt, advised pt that her A1c is stable at 7.2, continue with current medications, pt verbalized her understanding

## 2024-03-30 ASSESSMENT — ENCOUNTER SYMPTOMS
PALPITATIONS: 0
CHEST TIGHTNESS: 0
DIZZINESS: 0
SHORTNESS OF BREATH: 0
FATIGUE: 0
ABDOMINAL PAIN: 0
COUGH: 0
HEADACHES: 0

## 2024-04-11 PROCEDURE — RXMED WILLOW AMBULATORY MEDICATION CHARGE

## 2024-04-12 ENCOUNTER — PHARMACY VISIT (OUTPATIENT)
Dept: PHARMACY | Facility: CLINIC | Age: 84
End: 2024-04-12
Payer: MEDICARE

## 2024-04-30 ENCOUNTER — OFFICE VISIT (OUTPATIENT)
Dept: PRIMARY CARE | Facility: CLINIC | Age: 84
End: 2024-04-30
Payer: MEDICARE

## 2024-04-30 VITALS
SYSTOLIC BLOOD PRESSURE: 110 MMHG | DIASTOLIC BLOOD PRESSURE: 72 MMHG | HEIGHT: 64 IN | HEART RATE: 64 BPM | TEMPERATURE: 97.2 F | WEIGHT: 192.8 LBS | BODY MASS INDEX: 32.91 KG/M2

## 2024-04-30 DIAGNOSIS — E66.9 CLASS 1 OBESITY WITH SERIOUS COMORBIDITY AND BODY MASS INDEX (BMI) OF 33.0 TO 33.9 IN ADULT, UNSPECIFIED OBESITY TYPE: ICD-10-CM

## 2024-04-30 DIAGNOSIS — G89.29 CHRONIC RIGHT SHOULDER PAIN: ICD-10-CM

## 2024-04-30 DIAGNOSIS — E11.22 TYPE 2 DIABETES MELLITUS WITH CHRONIC KIDNEY DISEASE, WITHOUT LONG-TERM CURRENT USE OF INSULIN, UNSPECIFIED CKD STAGE (MULTI): ICD-10-CM

## 2024-04-30 DIAGNOSIS — M25.511 CHRONIC RIGHT SHOULDER PAIN: ICD-10-CM

## 2024-04-30 DIAGNOSIS — I10 ESSENTIAL HYPERTENSION: Chronic | ICD-10-CM

## 2024-04-30 DIAGNOSIS — E78.5 HYPERLIPIDEMIA, UNSPECIFIED HYPERLIPIDEMIA TYPE: ICD-10-CM

## 2024-04-30 DIAGNOSIS — B37.0 ORAL THRUSH: Primary | ICD-10-CM

## 2024-04-30 PROBLEM — E66.811 CLASS 1 OBESITY WITH SERIOUS COMORBIDITY AND BODY MASS INDEX (BMI) OF 33.0 TO 33.9 IN ADULT: Status: ACTIVE | Noted: 2024-04-30

## 2024-04-30 PROCEDURE — 1159F MED LIST DOCD IN RCRD: CPT | Performed by: INTERNAL MEDICINE

## 2024-04-30 PROCEDURE — 1160F RVW MEDS BY RX/DR IN RCRD: CPT | Performed by: INTERNAL MEDICINE

## 2024-04-30 PROCEDURE — 1036F TOBACCO NON-USER: CPT | Performed by: INTERNAL MEDICINE

## 2024-04-30 PROCEDURE — 1157F ADVNC CARE PLAN IN RCRD: CPT | Performed by: INTERNAL MEDICINE

## 2024-04-30 PROCEDURE — 3074F SYST BP LT 130 MM HG: CPT | Performed by: INTERNAL MEDICINE

## 2024-04-30 PROCEDURE — 3078F DIAST BP <80 MM HG: CPT | Performed by: INTERNAL MEDICINE

## 2024-04-30 PROCEDURE — 1123F ACP DISCUSS/DSCN MKR DOCD: CPT | Performed by: INTERNAL MEDICINE

## 2024-04-30 PROCEDURE — 99214 OFFICE O/P EST MOD 30 MIN: CPT | Performed by: INTERNAL MEDICINE

## 2024-04-30 RX ORDER — CLOTRIMAZOLE 10 MG/1
10 LOZENGE ORAL; TOPICAL
Qty: 70 TROCHE | Refills: 0 | Status: SHIPPED | OUTPATIENT
Start: 2024-04-30 | End: 2024-05-14

## 2024-04-30 ASSESSMENT — PATIENT HEALTH QUESTIONNAIRE - PHQ9
SUM OF ALL RESPONSES TO PHQ9 QUESTIONS 1 AND 2: 0
1. LITTLE INTEREST OR PLEASURE IN DOING THINGS: NOT AT ALL
2. FEELING DOWN, DEPRESSED OR HOPELESS: NOT AT ALL

## 2024-04-30 NOTE — PROGRESS NOTES
Subjective   Patient ID: Lakshmi Trinh is a 83 y.o. female who presents for Thrush (She was given Nystatin solution.  She finished the bottle but thinks she still has some thrush.).    HPI Pt is a pleasant 83 y.o. CF with extensive PMHX who was seen and evaluated during the OV with 2 weeks hx of the discomfort of the tongue when she chews food as well as white scurf on the tongue. Pt did not have any recent dental work, no recent sickness or change of the oral hygiene. Pt went to the local  and was treated with 6 day course of the oral nystatin which was partially helpful. Pt also has the r shoulder pain around the baseline. Pt would like to have the massage to help with the pain alleviation and requested the referral for the massage therapy. During the clinical encounter pt denies fever, chills, no SOB, no chest pain/tightness, no abdominal pain, no N/V/D reported, no change of urination reported. Pt denies any other health concerns during this visit.  Sohx: reviewed  List of the medications and allergies: reviewed  PMHx and Fhx: reviewed    Review of Systems  The systems have been reviewed as follows:   Constitutional: no fever, no chills  Eyes: no eyesight problems, no eye redness, no eye pain, no blurred vision  ENT: no ear pain or sore throat, no nasal discharge or congestion, no sinus pressure, no hearing loss, but as mentioned at HPI  Cardiovascular: no chest pain or tightness, no SOB, the heart rate was not fast or slow  Respiratory: no cough, no dyspnea with exertion or with rest, no wheezing  Gastrointestinal: no abdominal pain, no constipation or diarrhea, no heartburn, no nausea or vomiting  Genitourinary: no urine frequency, no dysuria, no urinary urgency, no urinary incontinence or retention  Musculoskeletal: no back pain, no muscle weakness, but as mentioned at HPI  Integumentary: no skin lesions or rash  Neurological: no headaches, no dizziness or fainting, no limb weakness  Psychiatric: no  "mood changes, no anxiety or depression, no SI/HI  Endocrine: no weight change, no temperature intolerance, no   change of appetite  Hematologic/Lymphatic: no easy bruising or bleeding    Objective   /72 (BP Location: Left arm, Patient Position: Sitting, BP Cuff Size: Adult)   Pulse 64   Temp 36.2 °C (97.2 °F)   Ht 1.626 m (5' 4\")   Wt 87.5 kg (192 lb 12.8 oz)   BMI 33.09 kg/m²     Physical Exam  Constitutional: well hydrated, well nourished, no acute distress. Vital signs reviewed  Head and face: normocephalic, atraumatic  Eyes: no erythema, edema or visible abnormalities of conjunctiva or lids. Pupils are equal, round and reactive to light. Extraocular movement normal  ENT: external ears without deformities. The oral exam showed;  white plaques on the tongue  Neck: full ROM, no adenopathy, neck was supple, thyroid gland not enlarged, no palpable nodules  Pulmonary: normal respiratory rate and effort. Lungs are clear to auscultation, no rales,no rhonchi or wheezing  Cardiovascular: RRR, normal S1 and S2, no murmur, no gallop, posterior tib. pulse normal 2+ bilaterally, no lower extremity edema  Abdomen: soft, non tender on palpation, not distended, normal BS in all 4 quadrants  Musculoskeletal: no joint swelling, normal movements of all 4 extremities. Gait and station: normal, Digits and nails: normal without clubbing  Skin: normal color, no rash  Neurologic: Cranial nerves: CN II: visual acuity intact. Source: acuity reported by patient. Pupils reactive to light with normal accommodation. Right and left pupil normal CN III, IV and VI: the EO movements were intact. CN VIII: no hearing loss. Sensory exam: normal to light touch  Psychiatric: Mood and affect: normal, Alert and Oriented x 3  Lymphatic: no cervical lymphadenopathy  Assessment/Plan   Oral thrush:   Hx and PE as mentioned  Will start clotrimazole gian 10 mg  5 times a day x 14 days  Pt was instructed to contact PCP if pt will have no " improvement of the condition/worsening of the sxs/new sxs on the current treatment  HTN: continue the current dose of lisinopril  HLD; On simvastatin 40 mg PO daily  Dmt2: on metformin 500 mg PO BID. [place] 25 mg PO daily  Obesity WHO class I; pt will be beneficial form the lifestyle mollification  I discussed every sxs with the pt in detail. Pt verbalized understanding of the health  condition and agreed with the clinical approach as discussed as mentioned above  Please FU with PCP as planned or sooner if needed.

## 2024-05-13 PROCEDURE — RXMED WILLOW AMBULATORY MEDICATION CHARGE

## 2024-05-14 ENCOUNTER — PHARMACY VISIT (OUTPATIENT)
Dept: PHARMACY | Facility: CLINIC | Age: 84
End: 2024-05-14
Payer: MEDICARE

## 2024-05-16 ENCOUNTER — ALLIED HEALTH (OUTPATIENT)
Dept: INTEGRATIVE MEDICINE | Facility: CLINIC | Age: 84
End: 2024-05-16

## 2024-05-16 DIAGNOSIS — G89.29 CHRONIC RIGHT SHOULDER PAIN: ICD-10-CM

## 2024-05-16 DIAGNOSIS — M25.511 CHRONIC RIGHT SHOULDER PAIN: ICD-10-CM

## 2024-05-16 PROCEDURE — MASS(60M) MASSAGE 60 MINUTES: Performed by: MASSAGE THERAPIST

## 2024-05-16 NOTE — PROGRESS NOTES
Massage Therapy Visit:     Lakshmi Trinh was self-referred.    Condition of Client Subjective :  Patient ID: Lakshmi Trinh is a 83 y.o. female who presents for reason for visit of Muscle tension release and Relaxation massage.  HPI    Session Information  Visit Type: New patient  Description of present complaint: Stress, Range of motion (ROM), Discomfort, Muscle tension, Chronic pain    Review of Systems    Objective   Pre-treatment Assessment  Condition of Client Note: Tension felt on neck, shoulders, upper back    Physical Exam    Actions Assessment/Plan :    Massage Treatment  Patient Position: Table, Supine  Positioning Assistance: Did not need assistance, Pillow(s)/bolster under knees while supine  Massage Technique: Therapeutic massage, Superficial fascial release, Stretching, Soft tissue mobilization, Relaxation massage  Pressure Scale: 1 - Light pressure, 2 - Mild pressure, 3 - Medium pressure  Action Note: Cervical/upper body, kneading, stripping, palming, effleurage, petrissage, cross fiber, on SCM, SITS, Levator Scapulae, Trapezius, Rhomboids,  Deltoids.  UE, stretching, cross fiber, palming, kneading, effleurage, on Deltoids, Biceps, Triceps, forearm muscles, hand muscles.  LE, kneading, stripping, palming, knuckle, effleurage, petrissage, cross fiber, stretching, on Vastus group, TFL, Hamstrings, Sartorius, Gracilis, Gastrocnemius, Soleus, Tibialis, plantar/dorsal aspect of foot.    Response:  Post-treatment Assessment  Response Note: Lakshmi shared she felt really good after this session.    Evaluation:   Massage Therapy Evaluation / Recommendation(s) / Follow-up  Post-Treatment Recommendation: Increase hydration  Follow-up: Follow up as needed.

## 2024-05-20 ENCOUNTER — TELEMEDICINE (OUTPATIENT)
Dept: PHARMACY | Facility: HOSPITAL | Age: 84
End: 2024-05-20
Payer: MEDICARE

## 2024-05-20 DIAGNOSIS — E11.22 TYPE 2 DIABETES MELLITUS WITH STAGE 3B CHRONIC KIDNEY DISEASE, WITHOUT LONG-TERM CURRENT USE OF INSULIN (MULTI): Primary | ICD-10-CM

## 2024-05-20 DIAGNOSIS — N18.32 TYPE 2 DIABETES MELLITUS WITH STAGE 3B CHRONIC KIDNEY DISEASE, WITHOUT LONG-TERM CURRENT USE OF INSULIN (MULTI): Primary | ICD-10-CM

## 2024-05-20 ASSESSMENT — ENCOUNTER SYMPTOMS: DIABETIC ASSOCIATED SYMPTOMS: 0

## 2024-05-20 NOTE — PROGRESS NOTES
Pharmacy Post-Discharge Visit  Lakshmi Trinh is a 83 y.o. female was referred to Clinical Pharmacy Team to complete a post-discharge medication optimization and monitoring visit.  The patient was referred for their Diabetes.      Referring Provider: Misty Rincon MD    Subjective   Allergies   Allergen Reactions    Nsaids (Non-Steroidal Anti-Inflammatory Drug) Hives       [id number] - [place], [place] - [address] [facility]  [address] [facility] [address]  Phone: [phone] Fax: [phone]    [facility]  [address], [address]  [facility] [address]  Phone: [phone] Fax: [phone]      Social History     Social History Narrative    Not on file          Diabetes  She presents for her follow-up diabetic visit. She has type 2 diabetes mellitus. Her disease course has been stable. There are no hypoglycemic associated symptoms. There are no diabetic associated symptoms. There are no hypoglycemic complications. Diabetic complications include nephropathy. Risk factors for coronary artery disease include hypertension, dyslipidemia, diabetes mellitus, obesity, sedentary lifestyle and tobacco exposure. Current diabetic treatment includes oral agent (dual therapy). She is compliant with treatment all of the time. Her weight is stable. Meal planning includes avoidance of concentrated sweets. Exercise: currently no dedicated time for exercise. (Not testing BG) An ACE inhibitor/angiotensin II receptor blocker is being taken.     Patient denies hyperglycemia symptoms. She denies any adverse effects with Jardiance. She does endorse a recent episode of oral thrush that was treated. Her A1c trended down from 7.4 to 7.2%. She has lost 4 lbs since last visit.       Medication System Management:  Affordability/Accessibility: Enrolled in  PAP  Adherence/Organization: none  Adverse Effects: none    Objective     There were no vitals taken for this visit.     LAB  Lab Results   Component  Value Date    BILITOT 0.6 2023    CALCIUM 9.7 2024    CO2 26 2024     2024    CREATININE 1.31 (H) 2024    GLUCOSE 148 (H) 2024    ALKPHOS 84 2023    K 4.5 2024    PROT 6.4 2023     2024    AST 15 2023    ALT 16 2023    BUN 24 (H) 2024    ANIONGAP 15 2024    MG 1.99 2023    PHOS 4.3 2023    ALBUMIN 3.9 2023    LIPASE 53 2023    GFRF 33 (A) 2023     Lab Results   Component Value Date    TRIG 225 (H) 2023    CHOL 157 2023    LDLCALC 72 2023    HDL 40.5 2023     Lab Results   Component Value Date    HGBA1C 7.2 (H) 2024         Current Outpatient Medications on File Prior to Visit   Medication Sig Dispense Refill    allopurinol (Zyloprim) 300 mg tablet Take 1 tablet (300 mg) by mouth once daily. 90 tablet 3    bumetanide (Bumex) 0.5 mg tablet Take 1 tablet (0.5 mg) by mouth once daily.      calcitriol (Rocaltrol) 0.25 mcg capsule Take 1 capsule (0.25 mcg) by mouth once daily. Take 2 capsules by mouth over the weekend and 1 capsule during the week 90 capsule 3    cholecalciferol (Vitamin D-3) 50 MCG (2000) tablet Take 1 tablet (50 mcg) by mouth once daily.      [] clotrimazole (Mycelex) 10 mg janeth Take 1 tablet (10 mg) by mouth 5 times a day for 14 days. 70 Janeth 0    empagliflozin (Jardiance) 25 mg Take 1 tablet (25 mg) by mouth once daily. 30 tablet 5    latanoprost (Xalatan) 0.005 % ophthalmic solution Administer 1 drop into both eyes once daily at bedtime.      lisinopril 2.5 mg tablet Take 1 tablet (2.5 mg) by mouth once daily. 90 tablet 1    metFORMIN (Glucophage) 500 mg tablet Take 1 tablet (500 mg) by mouth 2 times a day. 180 tablet 1    nitroglycerin (Nitrostat) 0.4 mg SL tablet Place 1 tablet (0.4 mg) under the tongue every 5 minutes if needed for chest pain. May repeat every 5 minutes for up to 3 doses. 30 tablet 0    simvastatin (Zocor) 40 mg  tablet Take 1 tablet (40 mg) by mouth once daily at bedtime. 90 tablet 1    timoloL maleate, PF, 0.5 % dropperette Administer 1 drop into both eyes twice a day.      vitamins A,C,E-zinc-copper (PreserVision AREDS) 4,296 mcg-226 mg-90 mg capsule Take 1 tablet by mouth every 12 hours.       No current facility-administered medications on file prior to visit.        HISTORICAL PHARMACOTHERAPY  -no relevant history    DRUG INTERATIONS  - no known significant drug interactions requiring a change in therapy    Assessment/Plan   Problem List Items Addressed This Visit       Type 2 diabetes mellitus with chronic kidney disease, without long-term current use of insulin (Multi) - Primary     Continue Jardiance with PAP.   Follow up in 1/2025 for PAP renewal and Jardiance refills.           Follow up: 1/13/2025    Continue all meds under the continuation of care with the referring provider and clinical pharmacy team.    Hema Downs, PharmD   PGY1 Pharmacy Resident    Verbal consent to manage patient's drug therapy was obtained from the patient. They were informed they may decline to participate or withdraw from participation in pharmacy services at any time.

## 2024-05-20 NOTE — PROGRESS NOTES
I reviewed the progress note and agree with the resident’s findings and plans as written. Case discussed with resident.    Lorin Richter, PharmD

## 2024-06-13 DIAGNOSIS — N18.32 TYPE 2 DIABETES MELLITUS WITH STAGE 3B CHRONIC KIDNEY DISEASE, WITHOUT LONG-TERM CURRENT USE OF INSULIN (MULTI): Primary | ICD-10-CM

## 2024-06-13 DIAGNOSIS — E11.22 TYPE 2 DIABETES MELLITUS WITH STAGE 3B CHRONIC KIDNEY DISEASE, WITHOUT LONG-TERM CURRENT USE OF INSULIN (MULTI): Primary | ICD-10-CM

## 2024-06-13 PROCEDURE — RXMED WILLOW AMBULATORY MEDICATION CHARGE

## 2024-06-14 ENCOUNTER — PHARMACY VISIT (OUTPATIENT)
Dept: PHARMACY | Facility: CLINIC | Age: 84
End: 2024-06-14
Payer: MEDICARE

## 2024-06-14 DIAGNOSIS — I10 HYPERTENSION, UNSPECIFIED TYPE: ICD-10-CM

## 2024-06-14 DIAGNOSIS — E78.5 HYPERLIPIDEMIA, UNSPECIFIED HYPERLIPIDEMIA TYPE: ICD-10-CM

## 2024-06-14 DIAGNOSIS — E11.22 TYPE 2 DIABETES MELLITUS WITH STAGE 3 CHRONIC KIDNEY DISEASE, WITHOUT LONG-TERM CURRENT USE OF INSULIN, UNSPECIFIED WHETHER STAGE 3A OR 3B CKD (MULTI): ICD-10-CM

## 2024-06-14 DIAGNOSIS — N18.30 TYPE 2 DIABETES MELLITUS WITH STAGE 3 CHRONIC KIDNEY DISEASE, WITHOUT LONG-TERM CURRENT USE OF INSULIN, UNSPECIFIED WHETHER STAGE 3A OR 3B CKD (MULTI): ICD-10-CM

## 2024-06-14 RX ORDER — LISINOPRIL 2.5 MG/1
2.5 TABLET ORAL DAILY
Qty: 90 TABLET | Refills: 1 | Status: CANCELLED | OUTPATIENT
Start: 2024-06-14

## 2024-06-14 RX ORDER — SIMVASTATIN 40 MG/1
40 TABLET, FILM COATED ORAL NIGHTLY
Qty: 90 TABLET | Refills: 1 | Status: CANCELLED | OUTPATIENT
Start: 2024-06-14

## 2024-07-27 DIAGNOSIS — N18.31 STAGE 3A CHRONIC KIDNEY DISEASE (MULTI): Primary | ICD-10-CM

## 2024-07-29 RX ORDER — BUMETANIDE 0.5 MG/1
0.5 TABLET ORAL
Qty: 90 TABLET | Refills: 3 | Status: SHIPPED | OUTPATIENT
Start: 2024-07-29

## 2024-08-14 ENCOUNTER — APPOINTMENT (OUTPATIENT)
Dept: PRIMARY CARE | Facility: CLINIC | Age: 84
End: 2024-08-14
Payer: MEDICARE

## 2024-09-16 ENCOUNTER — PHARMACY VISIT (OUTPATIENT)
Dept: PHARMACY | Facility: CLINIC | Age: 84
End: 2024-09-16
Payer: MEDICARE

## 2024-09-16 PROCEDURE — RXMED WILLOW AMBULATORY MEDICATION CHARGE

## 2024-09-27 ENCOUNTER — APPOINTMENT (OUTPATIENT)
Dept: NEPHROLOGY | Facility: CLINIC | Age: 84
End: 2024-09-27
Payer: MEDICARE

## 2024-09-27 ENCOUNTER — APPOINTMENT (OUTPATIENT)
Dept: PRIMARY CARE | Facility: CLINIC | Age: 84
End: 2024-09-27
Payer: MEDICARE

## 2024-10-02 ENCOUNTER — LAB (OUTPATIENT)
Dept: LAB | Facility: LAB | Age: 84
End: 2024-10-02
Payer: MEDICARE

## 2024-10-02 DIAGNOSIS — N18.31 STAGE 3A CHRONIC KIDNEY DISEASE (MULTI): ICD-10-CM

## 2024-10-02 DIAGNOSIS — N18.30 CKD STAGE 3 DUE TO TYPE 2 DIABETES MELLITUS (MULTI): ICD-10-CM

## 2024-10-02 DIAGNOSIS — E78.2 MIXED HYPERLIPIDEMIA: ICD-10-CM

## 2024-10-02 DIAGNOSIS — N18.32 TYPE 2 DIABETES MELLITUS WITH STAGE 3B CHRONIC KIDNEY DISEASE, WITHOUT LONG-TERM CURRENT USE OF INSULIN (MULTI): ICD-10-CM

## 2024-10-02 DIAGNOSIS — E11.22 TYPE 2 DIABETES MELLITUS WITH STAGE 3B CHRONIC KIDNEY DISEASE, WITHOUT LONG-TERM CURRENT USE OF INSULIN (MULTI): ICD-10-CM

## 2024-10-02 DIAGNOSIS — E11.22 CKD STAGE 3 DUE TO TYPE 2 DIABETES MELLITUS (MULTI): ICD-10-CM

## 2024-10-02 DIAGNOSIS — I10 ESSENTIAL HYPERTENSION: ICD-10-CM

## 2024-10-02 DIAGNOSIS — E21.3 HYPERPARATHYROIDISM (MULTI): ICD-10-CM

## 2024-10-02 LAB
ALBUMIN SERPL BCP-MCNC: 4.3 G/DL (ref 3.4–5)
ALP SERPL-CCNC: 108 U/L (ref 33–136)
ALT SERPL W P-5'-P-CCNC: 13 U/L (ref 7–45)
ANION GAP SERPL CALC-SCNC: 15 MMOL/L (ref 10–20)
AST SERPL W P-5'-P-CCNC: 14 U/L (ref 9–39)
BASOPHILS # BLD AUTO: 0.06 X10*3/UL (ref 0–0.1)
BASOPHILS NFR BLD AUTO: 0.8 %
BILIRUB DIRECT SERPL-MCNC: 0.1 MG/DL (ref 0–0.3)
BILIRUB SERPL-MCNC: 0.6 MG/DL (ref 0–1.2)
BUN SERPL-MCNC: 36 MG/DL (ref 6–23)
CALCIUM SERPL-MCNC: 9.9 MG/DL (ref 8.6–10.3)
CHLORIDE SERPL-SCNC: 102 MMOL/L (ref 98–107)
CHOLEST SERPL-MCNC: 166 MG/DL (ref 0–199)
CHOLESTEROL/HDL RATIO: 4.4
CO2 SERPL-SCNC: 28 MMOL/L (ref 21–32)
CREAT SERPL-MCNC: 1.43 MG/DL (ref 0.5–1.05)
EGFRCR SERPLBLD CKD-EPI 2021: 37 ML/MIN/1.73M*2
EOSINOPHIL # BLD AUTO: 0.37 X10*3/UL (ref 0–0.4)
EOSINOPHIL NFR BLD AUTO: 5.1 %
ERYTHROCYTE [DISTWIDTH] IN BLOOD BY AUTOMATED COUNT: 14.4 % (ref 11.5–14.5)
EST. AVERAGE GLUCOSE BLD GHB EST-MCNC: 166 MG/DL
GLUCOSE SERPL-MCNC: 161 MG/DL (ref 74–99)
HBA1C MFR BLD: 7.4 %
HCT VFR BLD AUTO: 48 % (ref 36–46)
HDLC SERPL-MCNC: 37.9 MG/DL
HGB BLD-MCNC: 15.5 G/DL (ref 12–16)
IMM GRANULOCYTES # BLD AUTO: 0.02 X10*3/UL (ref 0–0.5)
IMM GRANULOCYTES NFR BLD AUTO: 0.3 % (ref 0–0.9)
LDLC SERPL CALC-MCNC: 79 MG/DL
LYMPHOCYTES # BLD AUTO: 2.9 X10*3/UL (ref 0.8–3)
LYMPHOCYTES NFR BLD AUTO: 39.9 %
MCH RBC QN AUTO: 31.4 PG (ref 26–34)
MCHC RBC AUTO-ENTMCNC: 32.3 G/DL (ref 32–36)
MCV RBC AUTO: 97 FL (ref 80–100)
MONOCYTES # BLD AUTO: 0.62 X10*3/UL (ref 0.05–0.8)
MONOCYTES NFR BLD AUTO: 8.5 %
NEUTROPHILS # BLD AUTO: 3.29 X10*3/UL (ref 1.6–5.5)
NEUTROPHILS NFR BLD AUTO: 45.4 %
NON HDL CHOLESTEROL: 128 MG/DL (ref 0–149)
NRBC BLD-RTO: 0 /100 WBCS (ref 0–0)
PLATELET # BLD AUTO: 224 X10*3/UL (ref 150–450)
POTASSIUM SERPL-SCNC: 4.1 MMOL/L (ref 3.5–5.3)
PROT SERPL-MCNC: 6.6 G/DL (ref 6.4–8.2)
PTH-INTACT SERPL-MCNC: 27.9 PG/ML (ref 18.5–88)
RBC # BLD AUTO: 4.93 X10*6/UL (ref 4–5.2)
SODIUM SERPL-SCNC: 141 MMOL/L (ref 136–145)
TRIGL SERPL-MCNC: 245 MG/DL (ref 0–149)
VLDL: 49 MG/DL (ref 0–40)
WBC # BLD AUTO: 7.3 X10*3/UL (ref 4.4–11.3)

## 2024-10-02 PROCEDURE — 80061 LIPID PANEL: CPT

## 2024-10-02 PROCEDURE — 83036 HEMOGLOBIN GLYCOSYLATED A1C: CPT

## 2024-10-02 PROCEDURE — 85025 COMPLETE CBC W/AUTO DIFF WBC: CPT

## 2024-10-02 PROCEDURE — 82248 BILIRUBIN DIRECT: CPT

## 2024-10-02 PROCEDURE — 83970 ASSAY OF PARATHORMONE: CPT

## 2024-10-02 PROCEDURE — 36415 COLL VENOUS BLD VENIPUNCTURE: CPT

## 2024-10-02 PROCEDURE — 80053 COMPREHEN METABOLIC PANEL: CPT

## 2024-10-04 ENCOUNTER — APPOINTMENT (OUTPATIENT)
Dept: PRIMARY CARE | Facility: CLINIC | Age: 84
End: 2024-10-04
Payer: MEDICARE

## 2024-10-04 ENCOUNTER — APPOINTMENT (OUTPATIENT)
Dept: NEPHROLOGY | Facility: CLINIC | Age: 84
End: 2024-10-04
Payer: MEDICARE

## 2024-10-04 VITALS
BODY MASS INDEX: 32.27 KG/M2 | HEIGHT: 64 IN | WEIGHT: 189 LBS | SYSTOLIC BLOOD PRESSURE: 135 MMHG | HEART RATE: 75 BPM | DIASTOLIC BLOOD PRESSURE: 81 MMHG

## 2024-10-04 VITALS
DIASTOLIC BLOOD PRESSURE: 81 MMHG | HEIGHT: 64 IN | BODY MASS INDEX: 32.27 KG/M2 | SYSTOLIC BLOOD PRESSURE: 135 MMHG | HEART RATE: 76 BPM | WEIGHT: 189 LBS

## 2024-10-04 DIAGNOSIS — N18.32 TYPE 2 DIABETES MELLITUS WITH STAGE 3B CHRONIC KIDNEY DISEASE, WITHOUT LONG-TERM CURRENT USE OF INSULIN (MULTI): ICD-10-CM

## 2024-10-04 DIAGNOSIS — E11.65 TYPE 2 DIABETES MELLITUS WITH HYPERGLYCEMIA, WITHOUT LONG-TERM CURRENT USE OF INSULIN: Primary | ICD-10-CM

## 2024-10-04 DIAGNOSIS — N18.31 STAGE 3A CHRONIC KIDNEY DISEASE (MULTI): Primary | ICD-10-CM

## 2024-10-04 DIAGNOSIS — S70.12XS: ICD-10-CM

## 2024-10-04 DIAGNOSIS — E11.22 TYPE 2 DIABETES MELLITUS WITH STAGE 3B CHRONIC KIDNEY DISEASE, WITHOUT LONG-TERM CURRENT USE OF INSULIN (MULTI): ICD-10-CM

## 2024-10-04 DIAGNOSIS — E66.811 CLASS 1 OBESITY DUE TO EXCESS CALORIES WITH SERIOUS COMORBIDITY AND BODY MASS INDEX (BMI) OF 32.0 TO 32.9 IN ADULT: ICD-10-CM

## 2024-10-04 DIAGNOSIS — I10 ESSENTIAL HYPERTENSION: Chronic | ICD-10-CM

## 2024-10-04 DIAGNOSIS — I10 ESSENTIAL HYPERTENSION: ICD-10-CM

## 2024-10-04 DIAGNOSIS — E78.5 HYPERLIPIDEMIA, UNSPECIFIED HYPERLIPIDEMIA TYPE: ICD-10-CM

## 2024-10-04 DIAGNOSIS — E21.3 HYPERPARATHYROIDISM (MULTI): ICD-10-CM

## 2024-10-04 DIAGNOSIS — E66.09 CLASS 1 OBESITY DUE TO EXCESS CALORIES WITH SERIOUS COMORBIDITY AND BODY MASS INDEX (BMI) OF 32.0 TO 32.9 IN ADULT: ICD-10-CM

## 2024-10-04 DIAGNOSIS — S70.02XS: ICD-10-CM

## 2024-10-04 PROCEDURE — 3079F DIAST BP 80-89 MM HG: CPT | Performed by: FAMILY MEDICINE

## 2024-10-04 PROCEDURE — 3075F SYST BP GE 130 - 139MM HG: CPT | Performed by: FAMILY MEDICINE

## 2024-10-04 PROCEDURE — 99213 OFFICE O/P EST LOW 20 MIN: CPT | Performed by: INTERNAL MEDICINE

## 2024-10-04 PROCEDURE — 1157F ADVNC CARE PLAN IN RCRD: CPT | Performed by: FAMILY MEDICINE

## 2024-10-04 PROCEDURE — 3075F SYST BP GE 130 - 139MM HG: CPT | Performed by: INTERNAL MEDICINE

## 2024-10-04 PROCEDURE — 1123F ACP DISCUSS/DSCN MKR DOCD: CPT | Performed by: INTERNAL MEDICINE

## 2024-10-04 PROCEDURE — 3079F DIAST BP 80-89 MM HG: CPT | Performed by: INTERNAL MEDICINE

## 2024-10-04 PROCEDURE — 1036F TOBACCO NON-USER: CPT | Performed by: INTERNAL MEDICINE

## 2024-10-04 PROCEDURE — G2211 COMPLEX E/M VISIT ADD ON: HCPCS | Performed by: FAMILY MEDICINE

## 2024-10-04 PROCEDURE — 1159F MED LIST DOCD IN RCRD: CPT | Performed by: INTERNAL MEDICINE

## 2024-10-04 PROCEDURE — 1157F ADVNC CARE PLAN IN RCRD: CPT | Performed by: INTERNAL MEDICINE

## 2024-10-04 PROCEDURE — 1123F ACP DISCUSS/DSCN MKR DOCD: CPT | Performed by: FAMILY MEDICINE

## 2024-10-04 PROCEDURE — 99214 OFFICE O/P EST MOD 30 MIN: CPT | Performed by: FAMILY MEDICINE

## 2024-10-04 PROCEDURE — 1036F TOBACCO NON-USER: CPT | Performed by: FAMILY MEDICINE

## 2024-10-04 RX ORDER — PREDNISONE 20 MG/1
40 TABLET ORAL DAILY
Qty: 6 TABLET | Refills: 0 | Status: SHIPPED | OUTPATIENT
Start: 2024-10-04 | End: 2024-10-07

## 2024-10-04 RX ORDER — LISINOPRIL 2.5 MG/1
2.5 TABLET ORAL DAILY
Qty: 90 TABLET | Refills: 1 | Status: SHIPPED | OUTPATIENT
Start: 2024-10-04

## 2024-10-04 RX ORDER — TRAMADOL HYDROCHLORIDE 50 MG/1
50 TABLET ORAL 2 TIMES DAILY PRN
Qty: 10 TABLET | Refills: 0 | Status: SHIPPED | OUTPATIENT
Start: 2024-10-04 | End: 2024-10-09

## 2024-10-04 RX ORDER — SIMVASTATIN 40 MG/1
40 TABLET, FILM COATED ORAL NIGHTLY
Qty: 90 TABLET | Refills: 1 | Status: SHIPPED | OUTPATIENT
Start: 2024-10-04

## 2024-10-04 RX ORDER — METFORMIN HYDROCHLORIDE 500 MG/1
500 TABLET ORAL 2 TIMES DAILY
Qty: 180 TABLET | Refills: 1 | Status: SHIPPED | OUTPATIENT
Start: 2024-10-04

## 2024-10-04 ASSESSMENT — LIFESTYLE VARIABLES
AUDIT-C TOTAL SCORE: 1
SKIP TO QUESTIONS 9-10: 1
HOW OFTEN DO YOU HAVE A DRINK CONTAINING ALCOHOL: MONTHLY OR LESS
HOW OFTEN DO YOU HAVE SIX OR MORE DRINKS ON ONE OCCASION: NEVER
HAVE YOU OR SOMEONE ELSE BEEN INJURED AS A RESULT OF YOUR DRINKING: NO
AUDIT TOTAL SCORE: 1
HAS A RELATIVE, FRIEND, DOCTOR, OR ANOTHER HEALTH PROFESSIONAL EXPRESSED CONCERN ABOUT YOUR DRINKING OR SUGGESTED YOU CUT DOWN: NO
HOW MANY STANDARD DRINKS CONTAINING ALCOHOL DO YOU HAVE ON A TYPICAL DAY: 1 OR 2

## 2024-10-04 ASSESSMENT — ENCOUNTER SYMPTOMS
HEADACHES: 0
DIZZINESS: 0
LIGHT-HEADEDNESS: 0
FATIGUE: 1
CHEST TIGHTNESS: 0
SHORTNESS OF BREATH: 0
ARTHRALGIAS: 1
MYALGIAS: 1
COUGH: 0

## 2024-10-04 NOTE — PROGRESS NOTES
"Subjective   Patient ID: Lakshmi Trinh is a 83 y.o. female who presents for Follow-up, ER follow up [place] Sunday last Sunday in August, Hip Pain (left), Knee Pain, and Left Without Being Seen (Hurting since the fall at end of August).    HPI   Here for follow-up and medication management, recent ED visit.  Fall/left hip and knee pain-tripped while watering lawn 10 days ago, fell forward landing on L side of body.  No LOC.  Saw orthopedics shortly after injury to evaluate her left hip and left knee joint replacements.  No concerns for hardware abnormalities.  However, still with significant left  hip and leg pain, impacting ability to ambulate and sleep.  DM-compliant with regular dosing of Jardiance and metformin.  No hypoglycemic episodes.  Has not checked blood sugars recently.   Hypertension-stable on current regimen.  Denies headaches, lightheadedness, dizziness.  Checks blood pressure occasionally at local pharmacy and reports readings are well-controlled  Hyperlipidemia-stable on statin therapy.  CKD-continues to follow with nephrology.  Review of Systems   Constitutional:  Positive for fatigue.   Respiratory:  Negative for cough, chest tightness and shortness of breath.    Cardiovascular:  Negative for chest pain and leg swelling.   Musculoskeletal:  Positive for arthralgias, gait problem and myalgias.   Neurological:  Negative for dizziness, light-headedness and headaches.       Objective   /81   Pulse 76   Ht 1.626 m (5' 4\")   Wt 85.7 kg (189 lb)   BMI 32.44 kg/m²     Physical Exam  Vitals reviewed.   Constitutional:       Appearance: Normal appearance. She is obese.      Comments: Moderate distress with ambulation and maneuvering onto exam table.   HENT:      Head: Normocephalic and atraumatic.      Right Ear: Tympanic membrane and ear canal normal.      Left Ear: Tympanic membrane and ear canal normal.      Nose: Nose normal. No congestion.      Mouth/Throat:      Mouth: Mucous membranes " are moist.      Pharynx: Oropharynx is clear.   Eyes:      Extraocular Movements: Extraocular movements intact.      Conjunctiva/sclera: Conjunctivae normal.      Pupils: Pupils are equal, round, and reactive to light.   Cardiovascular:      Rate and Rhythm: Normal rate and regular rhythm.      Pulses: Normal pulses.      Heart sounds: Normal heart sounds. No murmur heard.  Pulmonary:      Effort: Pulmonary effort is normal. No respiratory distress.      Breath sounds: Normal breath sounds. No wheezing or rales.   Abdominal:      Palpations: Abdomen is soft.      Tenderness: There is no abdominal tenderness. There is no guarding.   Musculoskeletal:      Cervical back: Neck supple.      Left hip: Tenderness present. Decreased range of motion.      Left upper leg: Tenderness present.        Legs:       Comments: Pain with palpation over the left trochanteric bursa and along the lateral left thigh.   Lymphadenopathy:      Cervical: No cervical adenopathy.   Neurological:      Mental Status: She is alert.       Lab Results   Component Value Date    WBC 7.3 10/02/2024    HGB 15.5 10/02/2024    HCT 48.0 (H) 10/02/2024     10/02/2024    CHOL 166 10/02/2024    TRIG 245 (H) 10/02/2024    HDL 37.9 10/02/2024    ALT 13 10/02/2024    AST 14 10/02/2024     10/02/2024    K 4.1 10/02/2024     10/02/2024    CREATININE 1.43 (H) 10/02/2024    BUN 36 (H) 10/02/2024    CO2 28 10/02/2024    TSH 4.24 (H) 12/12/2022    HGBA1C 7.4 (H) 10/02/2024     par    Assessment/Plan   Assessment & Plan  Type 2 diabetes mellitus with hyperglycemia, without long-term current use of insulin  Recent A1c increased.  Continue current medications, stressed the importance of healthy food choices, monitoring portion size.  Orders:    Basic metabolic panel; Future    Hemoglobin A1c; Future    Type 2 diabetes mellitus with stage 3b chronic kidney disease, without long-term current use of insulin (Multi)    Orders:    lisinopril 2.5 mg tablet;  Take 1 tablet (2.5 mg) by mouth once daily.    metFORMIN (Glucophage) 500 mg tablet; Take 1 tablet (500 mg) by mouth 2 times a day.    Essential hypertension  BP reasonably well-controlled  Orders:    lisinopril 2.5 mg tablet; Take 1 tablet (2.5 mg) by mouth once daily.    Class 1 obesity due to excess calories with serious comorbidity and body mass index (BMI) of 32.0 to 32.9 in adult         Contusion of hip and thigh, left, sequela  Suspect most of her discomfort is related to inflammation of the trochanteric bursa and associated soft tissue contusion and muscle spasm.  Recommend short-term course of prednisone to alleviate any acute inflammation and tramadol as needed for acute pain relief.  Consider follow-up for intrabursal corticosteroid injection with orthopedics if pain persists  Orders:    predniSONE (Deltasone) 20 mg tablet; Take 2 tablets (40 mg) by mouth once daily for 3 days.    traMADol (Ultram) 50 mg tablet; Take 1 tablet (50 mg) by mouth 2 times a day as needed for severe pain (7 - 10) for up to 5 days.    Hyperlipidemia, unspecified hyperlipidemia type    Orders:    simvastatin (Zocor) 40 mg tablet; Take 1 tablet (40 mg) by mouth once daily at bedtime.    Amended seasonal flu vaccine which she refused.  Encouraged RSV vaccine and COVID booster at pharmacy again which she declined.     Patient was identified as a fall risk. Risk prevention instructions provided.

## 2024-10-04 NOTE — PROGRESS NOTES
Lakshmi Trinh   83 y.o.    @WT@  N/Room: [id number]/Room/bed info not found    Subjective:   The patient is being seen for a routine clinic follow-up of chronic kidney disease. Recently, the disease has been stable. Disease complications:  No hyperkalemia, no hypocalcemia, no hyperphosphatemia, no metabolic acidosis, no coagulopathy, no uremic encephalopathy, no neuropathy and no renal osteodystrophy. The patient is currently asymptomatic. No associated symptoms are reported.       Meds:   Current Outpatient Medications   Medication Sig Dispense Refill    allopurinol (Zyloprim) 300 mg tablet Take 1 tablet (300 mg) by mouth once daily. 90 tablet 3    bumetanide (Bumex) 0.5 mg tablet TAKE ONE TABLET BY MOUTH EVERY MORNING 90 tablet 3    calcitriol (Rocaltrol) 0.25 mcg capsule Take 1 capsule (0.25 mcg) by mouth once daily. Take 2 capsules by mouth over the weekend and 1 capsule during the week 90 capsule 3    cholecalciferol (Vitamin D-3) 50 MCG (2000 UT) tablet Take 1 tablet (50 mcg) by mouth once daily.      empagliflozin (Jardiance) 25 mg Take 1 tablet (25 mg) by mouth once daily. 90 tablet 1    latanoprost (Xalatan) 0.005 % ophthalmic solution Administer 1 drop into both eyes once daily at bedtime.      lisinopril 2.5 mg tablet Take 1 tablet (2.5 mg) by mouth once daily. 90 tablet 1    metFORMIN (Glucophage) 500 mg tablet Take 1 tablet (500 mg) by mouth 2 times a day. 180 tablet 1    nitroglycerin (Nitrostat) 0.4 mg SL tablet Place 1 tablet (0.4 mg) under the tongue every 5 minutes if needed for chest pain. May repeat every 5 minutes for up to 3 doses. 30 tablet 0    simvastatin (Zocor) 40 mg tablet Take 1 tablet (40 mg) by mouth once daily at bedtime. 90 tablet 1    timoloL maleate, PF, 0.5 % dropperette Administer 1 drop into both eyes twice a day.      vitamins A,C,E-zinc-copper (PreserVision AREDS) 4,296 mcg-226 mg-90 mg capsule Take 1 tablet by mouth every 12 hours.       No current facility-administered  medications for this visit.          ROS:  The patient is awake and oriented. No dizziness or lightheadedness. No chills and no fever. No headaches. No nausea and no vomiting. No shortness of breath. No cough. No sputum. No chest pain. No chest tightness. No abdominal pain. No diarrhea and no constipation. No hematemesis or hemoptysis. No hematuria. No rectal bleeding. No melena. No epistaxis. No urinary symptoms. No flank pain. No leg edema. No leg pain. No weakness. No itching. Overall, the rest of the review of systems is also negative.  12 point review of systems otherwise negative as stated in HPI.        Physical Examination:        Vitals:    10/04/24 1005   BP: 135/81   Pulse: 75     General: The patient is awake, oriented, and is not in any distress.  Head and Neck: Normocephalic. No periorbital edema.  Eyes: non-icteric  Respiratory: Symmetric air entry. Symmetric chest expansion.No respiratory distress.  Skin: No maculopapular rash.  Musculoskeletal: No peripheral edema in both left and right upper extremities.  No edema in either left or right lower extremities.  Neuro Exam: Speech is fluent. Moves extremities.    Imaging:  === 01/02/24 ===    US RIGHT UPPER QUADRANT    - Impression -  Incomplete visualization of the pancreas. Otherwise unremarkable.    Signed by: Enrique Hutchinson 1/2/2024 2:44 PM  Dictation workstation:   [id number]       Blood Labs:  No results found for this or any previous visit (from the past 24 hour(s)).   Lab Results   Component Value Date    PTH 27.9 10/02/2024    PROTUR 5 06/05/2023    PHOS 4.3 06/05/2023      Lab Results   Component Value Date    GLUCOSE 161 (H) 10/02/2024    CALCIUM 9.9 10/02/2024     10/02/2024    K 4.1 10/02/2024    CO2 28 10/02/2024     10/02/2024    BUN 36 (H) 10/02/2024    CREATININE 1.43 (H) 10/02/2024         Assessment and Plan:    1. Chronic kidney disease stage III.  Her last Cr level is 1.43. Stable kidney function. Electrolytes are normal.  Volume status is good.      2. Secondary hyperparathyroidism. On calcitriol with good PTH level.     3. Hypertension. Blood pressure is acceptable. Continue the current medications.     4.  Dyslipidemia. The patient is on a statin and she is tolerating statin well.     6. Diabetes. No proteinuria based on spot urine protein creatinine ratio.     She will be seen in my office in about 6 months for followup.         Jose Juan Dooley MD  Senior Attending Physician  Director of Onco-Nephrology Program  Division of Nephrology & Hypertension  [facility]

## 2024-10-04 NOTE — ASSESSMENT & PLAN NOTE
BP reasonably well-controlled  Orders:    lisinopril 2.5 mg tablet; Take 1 tablet (2.5 mg) by mouth once daily.

## 2024-10-04 NOTE — ASSESSMENT & PLAN NOTE
Orders:    simvastatin (Zocor) 40 mg tablet; Take 1 tablet (40 mg) by mouth once daily at bedtime.

## 2024-10-04 NOTE — ASSESSMENT & PLAN NOTE
Orders:    lisinopril 2.5 mg tablet; Take 1 tablet (2.5 mg) by mouth once daily.    metFORMIN (Glucophage) 500 mg tablet; Take 1 tablet (500 mg) by mouth 2 times a day.

## 2024-10-04 NOTE — PATIENT INSTRUCTIONS

## 2024-10-08 ENCOUNTER — TELEPHONE (OUTPATIENT)
Dept: NEPHROLOGY | Facility: CLINIC | Age: 84
End: 2024-10-08
Payer: MEDICARE

## 2024-10-08 NOTE — PROGRESS NOTES
Pt notified and verbalized understanding. Pt stated she might go with someone at [facility] due to location

## 2024-10-09 ENCOUNTER — APPOINTMENT (OUTPATIENT)
Dept: UROLOGY | Facility: CLINIC | Age: 84
End: 2024-10-09
Payer: MEDICARE

## 2024-10-09 VITALS
TEMPERATURE: 96.9 F | HEART RATE: 56 BPM | HEIGHT: 63 IN | DIASTOLIC BLOOD PRESSURE: 79 MMHG | WEIGHT: 193 LBS | SYSTOLIC BLOOD PRESSURE: 131 MMHG | BODY MASS INDEX: 34.2 KG/M2

## 2024-10-09 DIAGNOSIS — R39.15 URGENCY OF URINATION: ICD-10-CM

## 2024-10-09 DIAGNOSIS — N39.41 URGE INCONTINENCE: Primary | ICD-10-CM

## 2024-10-09 LAB
POC APPEARANCE, URINE: CLEAR
POC BILIRUBIN, URINE: NEGATIVE
POC BLOOD, URINE: NEGATIVE
POC COLOR, URINE: YELLOW
POC GLUCOSE, URINE: ABNORMAL MG/DL
POC KETONES, URINE: NEGATIVE MG/DL
POC LEUKOCYTES, URINE: ABNORMAL
POC NITRITE,URINE: NEGATIVE
POC PH, URINE: 5.5 PH
POC PROTEIN, URINE: NEGATIVE MG/DL
POC SPECIFIC GRAVITY, URINE: 1.01
POC UROBILINOGEN, URINE: 0.2 EU/DL

## 2024-10-09 PROCEDURE — 1123F ACP DISCUSS/DSCN MKR DOCD: CPT | Performed by: NURSE PRACTITIONER

## 2024-10-09 PROCEDURE — 99203 OFFICE O/P NEW LOW 30 MIN: CPT | Performed by: NURSE PRACTITIONER

## 2024-10-09 PROCEDURE — 1159F MED LIST DOCD IN RCRD: CPT | Performed by: NURSE PRACTITIONER

## 2024-10-09 PROCEDURE — 51798 US URINE CAPACITY MEASURE: CPT | Performed by: NURSE PRACTITIONER

## 2024-10-09 PROCEDURE — 3078F DIAST BP <80 MM HG: CPT | Performed by: NURSE PRACTITIONER

## 2024-10-09 PROCEDURE — G2211 COMPLEX E/M VISIT ADD ON: HCPCS | Performed by: NURSE PRACTITIONER

## 2024-10-09 PROCEDURE — 3075F SYST BP GE 130 - 139MM HG: CPT | Performed by: NURSE PRACTITIONER

## 2024-10-09 PROCEDURE — 81003 URINALYSIS AUTO W/O SCOPE: CPT | Performed by: NURSE PRACTITIONER

## 2024-10-09 PROCEDURE — 1157F ADVNC CARE PLAN IN RCRD: CPT | Performed by: NURSE PRACTITIONER

## 2024-10-09 PROCEDURE — 1036F TOBACCO NON-USER: CPT | Performed by: NURSE PRACTITIONER

## 2024-10-09 NOTE — PATIENT INSTRUCTIONS
Plan:   Trial Gemtesa 75 mg daily     Decrease carbodyhdrates bread and pasta as glucose in urine elevated  R/t jardiance, discussed less carb/sugar in diet would help    Discussed procedures for OAB, will consider PTNS if needed  Folder given w information    Bladder diary now and again right before comes back    Follow up 4-6 weeks   Nurse line [phone]

## 2024-10-09 NOTE — PROGRESS NOTES
10/09/24   [id number]    Urinary symptoms, discuss PTNS     Subjective      HPI Lakshmi Trinh is a 83 y.o. female who presents for urinary symptoms, discuss PTNS.    Last seen 2023 w Dr. Rojas.   No UTIs, no hematuria  Gluc 500 in urine, on jardiance  Botox 100 units 22 w Dr. Rojas  Didn't feel helped    Also tried Myrbetriq 50 mg in  [id number]    Discussed AUA guidelines for OAB, will start w trial gemtesa and referral  pharmacy assistance program     Glaucoma, sciatica up last night w pain  Past Medical History:   Diagnosis Date    DVT, lower extremity (Multi) 2015    Encounter for other preprocedural examination     Preop examination    Encounter for screening for malignant neoplasm of colon     Colon cancer screening    Encounter for screening for malignant neoplasm of skin     Skin cancer screening    Morbid (severe) obesity due to excess calories (Multi) 2022    Class 2 severe obesity with serious comorbidity and body mass index (BMI) of 35.0 to 35.9 in adult    Personal history of other benign neoplasm     History of uterine leiomyoma    Status post left hip replacement 2015    Status post total left knee replacement 06/15/2017     Past Surgical History:   Procedure Laterality Date    OTHER SURGICAL HISTORY  2021    Hip surgery    OTHER SURGICAL HISTORY  2021    Hysterectomy    OTHER SURGICAL HISTORY  2022    Knee surgery    OTHER SURGICAL HISTORY  2022    Esophagogastroduodenoscopy    OTHER SURGICAL HISTORY  2022    Complete colonoscopy     Family History   Problem Relation Name Age of Onset    Stroke Mother      Heart attack Father       Social History     Tobacco Use    Smoking status: Former     Current packs/day: 0.00     Types: Cigarettes     Quit date:      Years since quittin.7    Smokeless tobacco: Never   Vaping Use    Vaping status: Never Used   Substance Use Topics    Alcohol use: Yes     Comment: 1/2 beer per week.    Drug use:  "Never         Objective     /79   Pulse 56   Temp 36.1 °C (96.9 °F)   Ht 1.6 m (5' 3\")   Wt 87.5 kg (193 lb)   BMI 34.19 kg/m²    Physical Exam  General: Appears comfortable and in no apparent distress, well nourished  Head: Normocephalic, atraumatic  Neck: trachea midline  Respiratory: respirations unlabored, no wheezes, and no use of accessory muscles  Cardiovascular: at rest no dyspnea, well perfused  Skin: no visible rashes or lesions  Neurologic: grossly intact, oriented to person, place, and time  Psychiatric: mood and affect appropriate  Musculoskeletal: in chair for appt. no difficulty w upper body movement    Assessment/Plan   Problem List Items Addressed This Visit    None  Visit Diagnoses       Urge incontinence    -  Primary    Relevant Orders    POCT UA Automated manually resulted (Completed)    Post-Void Residual (Completed)    Urgency of urination        Relevant Medications    vibegron 75 mg tablet          Orders Placed This Encounter   Procedures    Post-Void Residual    POCT UA Automated manually resulted     Order Specific Question:   Release result to [facility]     Answer:   Immediate [1]      Plan:   Trial Gemtesa 75 mg daily     Decrease carbodyhdrates bread and pasta as glucose in urine elevated  R/t jardiance, discussed less carb/sugar in diet would help    Discussed procedures for OAB, will consider PTNS if needed  Folder given w information    Bladder diary now and again right before comes back    Follow up 4-6 weeks   Nurse line [phone]       Steph Calero, APRN-CNP  Lab Results   Component Value Date    GLUCOSE 161 (H) 10/02/2024    CALCIUM 9.9 10/02/2024     10/02/2024    K 4.1 10/02/2024    CO2 28 10/02/2024     10/02/2024    BUN 36 (H) 10/02/2024    CREATININE 1.43 (H) 10/02/2024       "

## 2024-10-10 DIAGNOSIS — N39.41 URGE INCONTINENCE: Primary | ICD-10-CM

## 2024-10-31 ENCOUNTER — HOSPITAL ENCOUNTER (OUTPATIENT)
Dept: RADIOLOGY | Facility: CLINIC | Age: 84
Discharge: HOME | End: 2024-10-31
Payer: MEDICARE

## 2024-10-31 DIAGNOSIS — M79.605 PAIN IN LEFT LEG: ICD-10-CM

## 2024-10-31 PROCEDURE — 73700 CT LOWER EXTREMITY W/O DYE: CPT | Mod: LT

## 2024-11-04 ENCOUNTER — APPOINTMENT (OUTPATIENT)
Dept: PHARMACY | Facility: HOSPITAL | Age: 84
End: 2024-11-04
Payer: MEDICARE

## 2024-11-04 DIAGNOSIS — N39.41 URGE INCONTINENCE: ICD-10-CM

## 2024-11-04 PROCEDURE — RXMED WILLOW AMBULATORY MEDICATION CHARGE

## 2024-11-04 NOTE — PROGRESS NOTES
Patient ID: Lakshmi Trinh is a 83 y.o. female who presents for Urge incontinence.    Referring Provider: Steph Calero   Pt was referred for Gemtesa cost assistance     Preferred Pharmacy:    [id number] - [place], [place] - [address] [facility]  [address] [facility] [address]  Phone: [phone] Fax: [phone]    [facility]  [address], [address]  [facility] [address]  Phone: [phone] Fax: [phone]      Copay assistance:   Do you have copays on your medications? Yes  Do you have trouble affording your current medications? Yes     Patient Assistance Screening (VAF)    Patient verbally reports monthly or yearly income which is less than 400% federal poverty level   Application for program has been submitted for the following medications:   Gemtesa   Patient has been informed that program team will be reaching out to them to discuss necessary documentation, instructed to answer phone/return voicemail.   Patient aware this process may take up to 6 weeks.   If approved medication must be filled through [facility] pharmacy and may be picked up or mailed to patient.       Subjective      Urinary Symptoms  Medications that may contribute to symptoms:   Bumetanide - discussed taking in the morning to avoid nighttime awakenings - which patient does   Any history of:   Narrow-angle glaucoma? Patient does have glaucoma, unsure of what type   Impaired gastric emptying? No   Urinary retention? No    If diabetes, blood sugar controlled? HbA1c 7.4%  Frequently of bowel movement? Every other day   Tobacco use? No   How many protective undergarments are you utilizing daily? Pads daily     What medications have been tried/stopped?   Myrbetriq - didn't work   Current medication? Gemtesa 75 mg daily   When started? October 2024  Side effects? No   Improvement in symptoms? Yes       Cardiovascular Health  The ASCVD Risk score (Princess CASTRO, et al., 2019) failed to calculate for the following  "reasons:    The 2019 ASCVD risk score is only valid for ages 40 to 79    Risk score cannot be calculated because patient has a medical history suggesting prior/existing ASCVD    Lab Results   Component Value Date    CHOL 166 10/02/2024     Lab Results   Component Value Date    HDL 37.9 10/02/2024     Lab Results   Component Value Date    LDLCALC 79 10/02/2024     Lab Results   Component Value Date    TRIG 245 (H) 10/02/2024     No components found for: \"CHOLHDL\"        Blood Sugar Balance  Lab Results   Component Value Date    GLUCOSE 161 (H) 10/02/2024    HGBA1C 7.4 (H) 10/02/2024    HGBA1C 7.2 (H) 03/25/2024    HGBA1C 7.4 (A) 12/21/2023     No results found for: \"LEPTIN\", \"INSULFAST\", \"GLUF\"      Thyroid  Lab Results   Component Value Date    TSH 4.24 (H) 12/12/2022    FREET4 1.01 12/12/2022       Iron Status  No results found for: \"IRON\", \"TIBC\", \"FERRITIN\"     Kidney Function  Lab Results   Component Value Date    GFRF 33 (A) 09/14/2023    CREATININE 1.43 (H) 10/02/2024       Potassium  Lab Results   Component Value Date    K 4.1 10/02/2024        Vitamin D3  Lab Results   Component Value Date    VITD25 47 03/25/2024       Current Outpatient Medications on File Prior to Visit   Medication Sig Dispense Refill    allopurinol (Zyloprim) 300 mg tablet Take 1 tablet (300 mg) by mouth once daily. 90 tablet 3    bumetanide (Bumex) 0.5 mg tablet TAKE ONE TABLET BY MOUTH EVERY MORNING 90 tablet 3    calcitriol (Rocaltrol) 0.25 mcg capsule Take 1 capsule (0.25 mcg) by mouth once daily. Take 2 capsules by mouth over the weekend and 1 capsule during the week 90 capsule 3    cholecalciferol (Vitamin D-3) 50 MCG (2000 UT) tablet Take 1 tablet (50 mcg) by mouth once daily.      empagliflozin (Jardiance) 25 mg Take 1 tablet (25 mg) by mouth once daily. 90 tablet 1    latanoprost (Xalatan) 0.005 % ophthalmic solution Administer 1 drop into both eyes once daily at bedtime.      lisinopril 2.5 mg tablet Take 1 tablet (2.5 mg) by " mouth once daily. 90 tablet 1    metFORMIN (Glucophage) 500 mg tablet Take 1 tablet (500 mg) by mouth 2 times a day. 180 tablet 1    nitroglycerin (Nitrostat) 0.4 mg SL tablet Place 1 tablet (0.4 mg) under the tongue every 5 minutes if needed for chest pain. May repeat every 5 minutes for up to 3 doses. 30 tablet 0    simvastatin (Zocor) 40 mg tablet Take 1 tablet (40 mg) by mouth once daily at bedtime. 90 tablet 1    timoloL maleate, PF, 0.5 % dropperette Administer 1 drop into both eyes twice a day.      vibegron 75 mg tablet Take 1 tablet (75 mg) by mouth once daily for 28 doses. 28 tablet 0    vitamins A,C,E-zinc-copper (PreserVision AREDS) 4,296 mcg-226 mg-90 mg capsule Take 1 tablet by mouth every 12 hours.       No current facility-administered medications on file prior to visit.        Medication and allergy reconciliation completed     Drug Interactions   No significant drug interactions identified    Assessment/Plan     Patient is experiencing urinary urgency and incontinence, which has been going on for around two years. She is s/p Botox injection, which she did not feel helped her bladder symptoms. She reports that since starting Gemtesa her symptoms have improved and she is down from using 4-5 pads/day to only 2 pads/day.  Has tried before Myrbetriq - didn't work   Patient has already been approved for [facility] for Jardiance, will ask to review/add Gemtesa     Gemtesa   Discussed MOA: works by activating beta-3 adrenergic receptors in the bladder resulting in relaxation of the detrusor smooth muscle during the urine storage phase, thus increasing bladder capacity.  Provided education on administration and potential side effects including but not limited to hot flashes <2%, constipation/diarrhea <2%, dry mouth <2%, and headache <4%.   Gemtesa (vibegron) starts working almost immediately - within a few days of first taking it, with noticeable improvements in urinary urgency, frequency, and incontinence  noted in clinical trials at 2 weeks which were reported as significant by 12 weeks.    LABS  Lab Results   Component Value Date    GFRF 33 (A) 09/14/2023     GFR 37 mL/min as of 10/2/24      CONTINUE  Gemtesa 75 mg once daily    Follow-up: 12/3/24 @ 10:20 am      Time spent with pt: Total length of time 40 (minutes) of the encounter and more than 50% was spent counseling the patient.       Patient Assistance Program Approval:     We are pleased to inform you that your application for assistance has been approved.     This approval is valid through  11/4/2025  as long as the following criteria continue to be satisfied:     Your medication (Gemtesa) remains covered under your current insurance plan.   Your prescriber does not discontinue therapy.   You do not seek reimbursement from any other private or government-funded programs for the  medication.    Under this program, the pharmacy will first bill your insurance plan for your indemnified specified medication. The [employer] Fund will then offset your copay balance, so that your out-of pocket expense for your specialty medication will be $0.00.    Marion Yousif, PharmD       Continue all meds under the continuation of care with the referring provider and clinical pharmacy team.    Verbal consent to manage patient's drug therapy was obtained from the patient and/or an individual authorized to act on behalf of a patient. They were informed they may decline to participate or withdraw from participation in pharmacy services at any time.

## 2024-11-06 ENCOUNTER — PHARMACY VISIT (OUTPATIENT)
Dept: PHARMACY | Facility: CLINIC | Age: 84
End: 2024-11-06
Payer: MEDICARE

## 2024-11-13 ENCOUNTER — APPOINTMENT (OUTPATIENT)
Dept: UROLOGY | Facility: CLINIC | Age: 84
End: 2024-11-13
Payer: MEDICARE

## 2024-11-13 VITALS
DIASTOLIC BLOOD PRESSURE: 77 MMHG | WEIGHT: 190 LBS | BODY MASS INDEX: 33.66 KG/M2 | HEIGHT: 63 IN | SYSTOLIC BLOOD PRESSURE: 115 MMHG | TEMPERATURE: 97 F | HEART RATE: 68 BPM

## 2024-11-13 DIAGNOSIS — N39.41 URGE INCONTINENCE OF URINE: Primary | ICD-10-CM

## 2024-11-13 LAB
POC APPEARANCE, URINE: CLEAR
POC BILIRUBIN, URINE: NEGATIVE
POC BLOOD, URINE: NEGATIVE
POC COLOR, URINE: YELLOW
POC GLUCOSE, URINE: ABNORMAL MG/DL
POC KETONES, URINE: NEGATIVE MG/DL
POC LEUKOCYTES, URINE: NEGATIVE
POC NITRITE,URINE: NEGATIVE
POC PH, URINE: 6 PH
POC PROTEIN, URINE: NEGATIVE MG/DL
POC SPECIFIC GRAVITY, URINE: 1.01
POC UROBILINOGEN, URINE: 0.2 EU/DL

## 2024-11-13 PROCEDURE — 3078F DIAST BP <80 MM HG: CPT | Performed by: NURSE PRACTITIONER

## 2024-11-13 PROCEDURE — 1123F ACP DISCUSS/DSCN MKR DOCD: CPT | Performed by: NURSE PRACTITIONER

## 2024-11-13 PROCEDURE — 3074F SYST BP LT 130 MM HG: CPT | Performed by: NURSE PRACTITIONER

## 2024-11-13 PROCEDURE — 1036F TOBACCO NON-USER: CPT | Performed by: NURSE PRACTITIONER

## 2024-11-13 PROCEDURE — 51798 US URINE CAPACITY MEASURE: CPT | Performed by: NURSE PRACTITIONER

## 2024-11-13 PROCEDURE — 1159F MED LIST DOCD IN RCRD: CPT | Performed by: NURSE PRACTITIONER

## 2024-11-13 PROCEDURE — 99213 OFFICE O/P EST LOW 20 MIN: CPT | Performed by: NURSE PRACTITIONER

## 2024-11-13 PROCEDURE — G2211 COMPLEX E/M VISIT ADD ON: HCPCS | Performed by: NURSE PRACTITIONER

## 2024-11-13 PROCEDURE — 1157F ADVNC CARE PLAN IN RCRD: CPT | Performed by: NURSE PRACTITIONER

## 2024-11-13 PROCEDURE — 81003 URINALYSIS AUTO W/O SCOPE: CPT | Performed by: NURSE PRACTITIONER

## 2024-11-13 RX ORDER — DEXAMETHASONE 2 MG/1
2 TABLET ORAL 2 TIMES DAILY PRN
COMMUNITY

## 2024-11-13 NOTE — PROGRESS NOTES
11/13/24   [id number]    Follow up med response Gemtesa     Subjective      HPI Lakshmi Trinh is a 83 y.o. female who presents for urinary symptoms, follow up of Gemtesa. Feels 99% improvement. States using only 2 pads/day and 1 pad for 12 hours at night compared to previously using 5 pads/day. Not interested in PTNS or procedures at this time.    Recap from last visit:  Last seen March 2023 w Dr. Rojas.   No UTIs, no hematuria  Gluc 500 in urine, on jardiance  Botox 100 units 7/26/22 w Dr. Rojas  Didn't feel helped    Also tried Myrbetriq 50 mg in  [id number]    Discussed AUA guidelines for OAB, will start w trial gemtesa and referral  pharmacy assistance program     Glaucoma, sciatica up last night w pain  Past Medical History:   Diagnosis Date    DVT, lower extremity (Multi) 09/28/2015    Encounter for other preprocedural examination     Preop examination    Encounter for screening for malignant neoplasm of colon     Colon cancer screening    Encounter for screening for malignant neoplasm of skin     Skin cancer screening    Morbid (severe) obesity due to excess calories (Multi) 04/01/2022    Class 2 severe obesity with serious comorbidity and body mass index (BMI) of 35.0 to 35.9 in adult    Personal history of other benign neoplasm     History of uterine leiomyoma    Status post left hip replacement 09/02/2015    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8    Smokeless tobacco: Never   Vaping Use    Vaping status: Never Used   Substance Use Topics    Alcohol use: Yes     Comment: 1/2 beer per week.    Drug use: Never         Objective     /77   Pulse 68   Temp 36.1 °C (97 °F)   Ht 1.6 m (5' 3\")   Wt 86.2 kg (190 lb)   BMI 33.66 kg/m²    Physical Exam  General: Appears comfortable and in no apparent distress, well nourished  Head: Normocephalic, atraumatic  Neck: trachea midline  Respiratory: respirations unlabored, no wheezes, and no use of accessory muscles  Cardiovascular: at rest no dyspnea, well perfused  Skin: no visible rashes or lesions  Neurologic: grossly intact, oriented to person, place, and time  Psychiatric: mood and affect appropriate  Musculoskeletal: in chair for appt. no difficulty w upper body movement    Assessment/Plan   Problem List Items Addressed This Visit    None  Visit Diagnoses       Urge incontinence of urine    -  Primary    Relevant Orders    POCT UA Automated manually resulted (Completed)    Post-Void Residual (Completed)          Orders Placed This Encounter   Procedures    Post-Void Residual    POCT UA Automated manually resulted     Order Specific Question:   Release result to [employer] [facility] [facility]     Answer:   Immediate [1]      Plan:   Trial Gemtesa 75 mg daily, 99% improvement  Working with pharmacy assistance program    Decrease carbodyhdrates bread and pasta as glucose in urine elevated  R/t jardiance, discussed less carb/sugar in diet would help    Follow up 6 mos  Nurse line [phone]       Steph Calero, APRN-CNP  Lab Results   Component Value Date    GLUCOSE 161 (H) 10/02/2024    CALCIUM 9.9 10/02/2024     10/02/2024    K 4.1 10/02/2024    CO2 28 10/02/2024     10/02/2024    BUN 36 (H) 10/02/2024    CREATININE 1.43 (H) 10/02/2024       "

## 2024-11-13 NOTE — PATIENT INSTRUCTIONS
Continue Ang happy with results, 99% better  Happy w results  Working with pharmacy assistance program  Follow up 6 mos  Nurse line [phone]

## 2024-12-03 ENCOUNTER — APPOINTMENT (OUTPATIENT)
Dept: PHARMACY | Facility: HOSPITAL | Age: 84
End: 2024-12-03
Payer: MEDICARE

## 2024-12-03 DIAGNOSIS — N39.41 URGE INCONTINENCE: ICD-10-CM

## 2024-12-03 NOTE — PROGRESS NOTES
"  Patient ID: Lakshmi Trinh is a 83 y.o. female who presents for No chief complaint on file..    Referring Provider: Steph Calero   Pt was referred for Gemtesa cost assistance     Preferred Pharmacy:    [id number] - [place], [place] - [address] [facility]  [address] [facility] [address]  Phone: [phone] Fax: [phone]    [facility]  [address], [address]  [facility] [address]  Phone: [phone] Fax: [phone]      Copay assistance:   Do you have copays on your medications? Yes  Do you have trouble affording your current medications? Yes     Patient Assistance Screening (VAF)    Patient verbally reports monthly or yearly income which is less than 400% federal poverty level   Application for program has been submitted for the following medications:   Gemtesa   Patient has been informed that program team will be reaching out to them to discuss necessary documentation, instructed to answer phone/return voicemail.   Patient aware this process may take up to 6 weeks.   If approved medication must be filled through [facility] pharmacy and may be picked up or mailed to patient.       Subjective      Urinary Symptoms  Medications that may contribute to symptoms:   Bumetanide - discussed taking in the morning to avoid nighttime awakenings - which patient does   Any history of:   Narrow-angle glaucoma? Patient does have glaucoma, unsure of what type   Impaired gastric emptying? No   Urinary retention? No    If diabetes, blood sugar controlled? HbA1c 7.4%  Frequently of bowel movement? Every other day   Tobacco use? No   How many protective undergarments are you utilizing daily? Pads daily     What medications have been tried/stopped?   Myrbetriq - didn't work   Current medication? Gemtesa 75 mg daily   When started? October 2024  Side effects? - She mentions occasional \"looser\" stools that started a couple weeks ago unsure if related to Gemtesa and sweating more then previously " "where she sometimes wakes up sweaty. Will monitor as both potentially related to Gemtesa, patient wants to continue at this time.  Improvement in symptoms? Yes       Cardiovascular Health  The ASCVD Risk score (Princess CASTRO, et al., 2019) failed to calculate for the following reasons:    The 2019 ASCVD risk score is only valid for ages 40 to 79    Risk score cannot be calculated because patient has a medical history suggesting prior/existing ASCVD    Lab Results   Component Value Date    CHOL 166 10/02/2024     Lab Results   Component Value Date    HDL 37.9 10/02/2024     Lab Results   Component Value Date    LDLCALC 79 10/02/2024     Lab Results   Component Value Date    TRIG 245 (H) 10/02/2024     No components found for: \"CHOLHDL\"        Blood Sugar Balance  Lab Results   Component Value Date    GLUCOSE 161 (H) 10/02/2024    HGBA1C 7.4 (H) 10/02/2024    HGBA1C 7.2 (H) 03/25/2024    HGBA1C 7.4 (A) 12/21/2023     No results found for: \"LEPTIN\", \"INSULFAST\", \"GLUF\"      Thyroid  Lab Results   Component Value Date    TSH 4.24 (H) 12/12/2022    FREET4 1.01 12/12/2022       Iron Status  No results found for: \"IRON\", \"TIBC\", \"FERRITIN\"     Kidney Function  Lab Results   Component Value Date    GFRF 33 (A) 09/14/2023    CREATININE 1.43 (H) 10/02/2024       Potassium  Lab Results   Component Value Date    K 4.1 10/02/2024        Vitamin D3  Lab Results   Component Value Date    VITD25 47 03/25/2024       Current Outpatient Medications on File Prior to Visit   Medication Sig Dispense Refill    allopurinol (Zyloprim) 300 mg tablet Take 1 tablet (300 mg) by mouth once daily. 90 tablet 3    bumetanide (Bumex) 0.5 mg tablet TAKE ONE TABLET BY MOUTH EVERY MORNING 90 tablet 3    calcitriol (Rocaltrol) 0.25 mcg capsule Take 1 capsule (0.25 mcg) by mouth once daily. Take 2 capsules by mouth over the weekend and 1 capsule during the week 90 capsule 3    cholecalciferol (Vitamin D-3) 50 MCG (2000 UT) tablet Take 1 tablet (50 mcg) by " "mouth once daily.      dexAMETHasone (Decadron) 2 mg tablet Take 1 tablet (2 mg) by mouth 2 times a day as needed.      empagliflozin (Jardiance) 25 mg Take 1 tablet (25 mg) by mouth once daily. 90 tablet 1    latanoprost (Xalatan) 0.005 % ophthalmic solution Administer 1 drop into both eyes once daily at bedtime.      lisinopril 2.5 mg tablet Take 1 tablet (2.5 mg) by mouth once daily. 90 tablet 1    metFORMIN (Glucophage) 500 mg tablet Take 1 tablet (500 mg) by mouth 2 times a day. 180 tablet 1    nitroglycerin (Nitrostat) 0.4 mg SL tablet Place 1 tablet (0.4 mg) under the tongue every 5 minutes if needed for chest pain. May repeat every 5 minutes for up to 3 doses. 30 tablet 0    simvastatin (Zocor) 40 mg tablet Take 1 tablet (40 mg) by mouth once daily at bedtime. 90 tablet 1    timoloL maleate, PF, 0.5 % dropperette Administer 1 drop into both eyes twice a day.      vibegron 75 mg tablet Take 1 tablet (75 mg) by mouth once daily. 90 tablet 3    vitamins A,C,E-zinc-copper (PreserVision AREDS) 4,296 mcg-226 mg-90 mg capsule Take 1 tablet by mouth every 12 hours.       No current facility-administered medications on file prior to visit.        Medication and allergy reconciliation completed     Drug Interactions   No significant drug interactions identified    Assessment/Plan     Patient is experiencing urinary urgency and incontinence, which has been going on for around two years. She is s/p Botox injection, which she did not feel helped her bladder symptoms. She reports that since starting Gemtesa her symptoms have improved and she is down from using 4-5 pads/day to only 2 pads/day. Will continue Gemtesa through [facility] to continue improving/reducing her urinary urgency and incontinence. Will follow up with her in a couple weeks to see if \"looser\" stools and sweating has improved.   Has tried before Myrbetriq - didn't work   Patient has already been approved for [facility] for Jardiance, will ask to review/add Gemtesa "     Gemtesa   Discussed MOA: works by activating beta-3 adrenergic receptors in the bladder resulting in relaxation of the detrusor smooth muscle during the urine storage phase, thus increasing bladder capacity.  Provided education on administration and potential side effects including but not limited to hot flashes <2%, constipation/diarrhea <2%, dry mouth <2%, and headache <4%.   Gemtesa (vibegron) starts working almost immediately - within a few days of first taking it, with noticeable improvements in urinary urgency, frequency, and incontinence noted in clinical trials at 2 weeks which were reported as significant by 12 weeks.    LABS  Lab Results   Component Value Date    GFRF 33 (A) 09/14/2023     GFR 37 mL/min as of 10/2/24      CONTINUE  Gemtesa 75 mg once daily    Follow-up: 1/17/24 @ 10 am      Time spent with pt: Total length of time 15 (minutes) of the encounter and more than 50% was spent counseling the patient.       Patient Assistance Program Approval:     We are pleased to inform you that your application for assistance has been approved.     This approval is valid through  11/4/2025  as long as the following criteria continue to be satisfied:     Your medication (Gemtesa) remains covered under your current insurance plan.   Your prescriber does not discontinue therapy.   You do not seek reimbursement from any other private or government-funded programs for the  medication.    Under this program, the pharmacy will first bill your insurance plan for your indemnified specified medication. The [employer] Assistance Fund will then offset your copay balance, so that your out-of pocket expense for your specialty medication will be $0.00.    Marion Yousif, PharmD       Continue all meds under the continuation of care with the referring provider and clinical pharmacy team.    Verbal consent to manage patient's drug therapy was obtained from the patient and/or an individual authorized to act on behalf of a  patient. They were informed they may decline to participate or withdraw from participation in pharmacy services at any time.

## 2024-12-03 NOTE — Clinical Note
"Patient mentions sweating more sometimes and some \"looser\" stools over last week or two. Will follow up with her in a couple weeks to monitor if this improves or not. Patient agreeable"

## 2024-12-13 DIAGNOSIS — E11.22 TYPE 2 DIABETES MELLITUS WITH STAGE 3B CHRONIC KIDNEY DISEASE, WITHOUT LONG-TERM CURRENT USE OF INSULIN (MULTI): ICD-10-CM

## 2024-12-13 DIAGNOSIS — N18.32 TYPE 2 DIABETES MELLITUS WITH STAGE 3B CHRONIC KIDNEY DISEASE, WITHOUT LONG-TERM CURRENT USE OF INSULIN (MULTI): ICD-10-CM

## 2024-12-16 DIAGNOSIS — N18.32 TYPE 2 DIABETES MELLITUS WITH STAGE 3B CHRONIC KIDNEY DISEASE, WITHOUT LONG-TERM CURRENT USE OF INSULIN (MULTI): ICD-10-CM

## 2024-12-16 DIAGNOSIS — E11.22 TYPE 2 DIABETES MELLITUS WITH STAGE 3B CHRONIC KIDNEY DISEASE, WITHOUT LONG-TERM CURRENT USE OF INSULIN (MULTI): ICD-10-CM

## 2024-12-16 PROCEDURE — RXMED WILLOW AMBULATORY MEDICATION CHARGE

## 2024-12-18 ENCOUNTER — PHARMACY VISIT (OUTPATIENT)
Dept: PHARMACY | Facility: CLINIC | Age: 84
End: 2024-12-18
Payer: MEDICARE

## 2025-01-03 DIAGNOSIS — E21.3 HYPERPARATHYROIDISM (MULTI): ICD-10-CM

## 2025-01-03 DIAGNOSIS — I10 ESSENTIAL HYPERTENSION: ICD-10-CM

## 2025-01-03 DIAGNOSIS — N18.31 STAGE 3A CHRONIC KIDNEY DISEASE (MULTI): ICD-10-CM

## 2025-01-07 RX ORDER — CALCITRIOL 0.25 UG/1
CAPSULE ORAL
Qty: 90 CAPSULE | Refills: 3 | Status: SHIPPED | OUTPATIENT
Start: 2025-01-07

## 2025-01-09 ENCOUNTER — LAB (OUTPATIENT)
Dept: LAB | Facility: LAB | Age: 85
End: 2025-01-09
Payer: MEDICARE

## 2025-01-09 DIAGNOSIS — E11.65 TYPE 2 DIABETES MELLITUS WITH HYPERGLYCEMIA, WITHOUT LONG-TERM CURRENT USE OF INSULIN: ICD-10-CM

## 2025-01-09 LAB
ANION GAP SERPL CALC-SCNC: 15 MMOL/L (ref 10–20)
BUN SERPL-MCNC: 34 MG/DL (ref 6–23)
CALCIUM SERPL-MCNC: 9.7 MG/DL (ref 8.6–10.6)
CHLORIDE SERPL-SCNC: 105 MMOL/L (ref 98–107)
CO2 SERPL-SCNC: 25 MMOL/L (ref 21–32)
CREAT SERPL-MCNC: 1.34 MG/DL (ref 0.5–1.05)
EGFRCR SERPLBLD CKD-EPI 2021: 40 ML/MIN/1.73M*2
EST. AVERAGE GLUCOSE BLD GHB EST-MCNC: 154 MG/DL
GLUCOSE SERPL-MCNC: 204 MG/DL (ref 74–99)
HBA1C MFR BLD: 7 %
POTASSIUM SERPL-SCNC: 4 MMOL/L (ref 3.5–5.3)
SODIUM SERPL-SCNC: 141 MMOL/L (ref 136–145)

## 2025-01-09 PROCEDURE — 83036 HEMOGLOBIN GLYCOSYLATED A1C: CPT

## 2025-01-09 PROCEDURE — 80048 BASIC METABOLIC PNL TOTAL CA: CPT

## 2025-01-10 ENCOUNTER — HOSPITAL ENCOUNTER (OUTPATIENT)
Dept: RADIOLOGY | Facility: HOSPITAL | Age: 85
Discharge: HOME | End: 2025-01-10
Payer: MEDICARE

## 2025-01-10 ENCOUNTER — APPOINTMENT (OUTPATIENT)
Dept: PRIMARY CARE | Facility: CLINIC | Age: 85
End: 2025-01-10
Payer: MEDICARE

## 2025-01-10 VITALS
WEIGHT: 191 LBS | HEIGHT: 63 IN | HEART RATE: 64 BPM | TEMPERATURE: 96.8 F | BODY MASS INDEX: 33.84 KG/M2 | DIASTOLIC BLOOD PRESSURE: 74 MMHG | SYSTOLIC BLOOD PRESSURE: 115 MMHG

## 2025-01-10 DIAGNOSIS — R29.898 LEG HEAVINESS: ICD-10-CM

## 2025-01-10 DIAGNOSIS — I10 ESSENTIAL HYPERTENSION: Chronic | ICD-10-CM

## 2025-01-10 DIAGNOSIS — M48.061 SPINAL STENOSIS OF LUMBAR REGION, UNSPECIFIED WHETHER NEUROGENIC CLAUDICATION PRESENT: ICD-10-CM

## 2025-01-10 DIAGNOSIS — N18.32 TYPE 2 DIABETES MELLITUS WITH STAGE 3B CHRONIC KIDNEY DISEASE, WITHOUT LONG-TERM CURRENT USE OF INSULIN (MULTI): ICD-10-CM

## 2025-01-10 DIAGNOSIS — R20.0 LEFT LEG NUMBNESS: ICD-10-CM

## 2025-01-10 DIAGNOSIS — E11.22 TYPE 2 DIABETES MELLITUS WITH STAGE 3B CHRONIC KIDNEY DISEASE, WITHOUT LONG-TERM CURRENT USE OF INSULIN (MULTI): ICD-10-CM

## 2025-01-10 DIAGNOSIS — Z00.00 ROUTINE GENERAL MEDICAL EXAMINATION AT HEALTH CARE FACILITY: Primary | ICD-10-CM

## 2025-01-10 DIAGNOSIS — M47.816 LUMBAR SPONDYLOSIS: Chronic | ICD-10-CM

## 2025-01-10 DIAGNOSIS — E11.21 TYPE 2 DIABETES MELLITUS WITH DIABETIC NEPHROPATHY, WITHOUT LONG-TERM CURRENT USE OF INSULIN: ICD-10-CM

## 2025-01-10 DIAGNOSIS — E66.811 CLASS 1 OBESITY DUE TO EXCESS CALORIES WITH SERIOUS COMORBIDITY AND BODY MASS INDEX (BMI) OF 33.0 TO 33.9 IN ADULT: ICD-10-CM

## 2025-01-10 DIAGNOSIS — E66.09 CLASS 1 OBESITY DUE TO EXCESS CALORIES WITH SERIOUS COMORBIDITY AND BODY MASS INDEX (BMI) OF 33.0 TO 33.9 IN ADULT: ICD-10-CM

## 2025-01-10 DIAGNOSIS — Z78.0 ASYMPTOMATIC MENOPAUSAL STATE: ICD-10-CM

## 2025-01-10 DIAGNOSIS — Z12.31 ENCOUNTER FOR SCREENING MAMMOGRAM FOR BREAST CANCER: ICD-10-CM

## 2025-01-10 DIAGNOSIS — M85.80 OSTEOPENIA, UNSPECIFIED LOCATION: ICD-10-CM

## 2025-01-10 PROCEDURE — 72100 X-RAY EXAM L-S SPINE 2/3 VWS: CPT

## 2025-01-10 RX ORDER — PREDNISONE 20 MG/1
40 TABLET ORAL DAILY
Qty: 10 TABLET | Refills: 0 | Status: SHIPPED | OUTPATIENT
Start: 2025-01-10 | End: 2025-01-15

## 2025-01-10 ASSESSMENT — PATIENT HEALTH QUESTIONNAIRE - PHQ9
1. LITTLE INTEREST OR PLEASURE IN DOING THINGS: NOT AT ALL
SUM OF ALL RESPONSES TO PHQ9 QUESTIONS 1 AND 2: 0
2. FEELING DOWN, DEPRESSED OR HOPELESS: NOT AT ALL

## 2025-01-10 ASSESSMENT — ACTIVITIES OF DAILY LIVING (ADL)
DOING_HOUSEWORK: INDEPENDENT
MANAGING_FINANCES: INDEPENDENT
GROCERY_SHOPPING: INDEPENDENT
BATHING: INDEPENDENT
TAKING_MEDICATION: INDEPENDENT
DRESSING: INDEPENDENT

## 2025-01-10 NOTE — ASSESSMENT & PLAN NOTE
Orders:    XR lumbar spine 2-3 views; Future    predniSONE (Deltasone) 20 mg tablet; Take 2 tablets (40 mg) by mouth once daily for 5 days.

## 2025-01-10 NOTE — PROGRESS NOTES
Subjective   Reason for Visit: Lakshmi Trinh is an 84 y.o. female here for a Medicare Wellness visit.     Past Medical, Surgical, and Family History reviewed and updated in chart.    Reviewed all medications by prescribing practitioner or clinical pharmacist (such as prescriptions, OTCs, herbal therapies and supplements) and documented in the medical record.  Medicare Wellness Billing Compliance Satisfied    *This is a visual tool to show completion of required items on the day of the visit. Green checks will only appear on the date of visit.    Review all medications by prescribing practitioner or clinical pharmacist (such as prescriptions, OTCs, herbal therapies and supplements) documented in the medical record    Past Medical, Surgical, and Family History reviewed and updated in chart    Tobacco Use Reviewed    Alcohol Use Reviewed    Illicit Drug Use Reviewed    PHQ2/9    Falls in Last Year Reviewed    Home Safety Risk Factors Reviewed    Cognitive Impairment Reviewed    Patient Self Assessment and Health Status    Current Diet Reviewed    Exercise Frequency    ADL - Hearing Impairment    ADL - Bathing    ADL - Dressing    ADL - Walks in Home    IADL - Managing Finances    IADL - Grocery Shopping    IADL - Taking Medications    IADL - Doing Housework      HPI  Here for annual illness visit and follow-up  Nutrition: Tries to eat balanced diet, cutting back on sugar intake.  Tries to get adequate water daily.  Exercise: Housework, occasional walk, limited by arthritis and back pain  Sleep: Reports adequate rest  Eye: Pending  Dental: Up-to-date  1.  Type 2 diabetes-compliant with metformin and Farxiga.  Denies any symptoms of hypoglycemia.  2.  Hyperlipidemia-reports continued tolerance of simvastatin.  Denies chest pains, palpitations, dyspnea on exertion.  3.  Leg heaviness-in past few months, frequent sense of heaviness in both lower extremities, worse with prolonged standing or walking.  " Frequent numbness in the left lower leg.  Occasional sense of both legs giving way.  No acute back pain.  No bowel or bladder incontinence  4.  CKD-continues to follow with nephrology  Patient Care Team:  Misty Rincon MD as PCP - General  Misty Rincon MD as PCP - [employer] Medicare Advantage PCP  Berhane Winslow MD as Vascular (Vascular Medicine)  Jose Juan Dooley MD as Nephrologist (Nephrology)  Safia Sanchez MD as Referring Physician (Ophthalmology)  Helio Gan MD as Consulting Physician (Orthopaedic Surgery)  Yuliya Rojas MD MPH as Consulting Physician (Urology)  Manolo Lacy MD as Cardiologist (Electrophysiology)     Review of Systems  Per HPI  Objective   Vitals:  /74 (BP Location: Left arm, Patient Position: Sitting)   Pulse 64   Temp 36 °C (96.8 °F)   Ht 1.6 m (5' 3\")   Wt 86.6 kg (191 lb)   BMI 33.83 kg/m²       Physical Exam  Vitals reviewed.   Constitutional:       General: She is not in acute distress.     Appearance: Normal appearance. She is normal weight. She is not ill-appearing.   HENT:      Head: Normocephalic and atraumatic.      Right Ear: Tympanic membrane and ear canal normal. There is no impacted cerumen.      Left Ear: Tympanic membrane and ear canal normal. There is no impacted cerumen.      Nose: Nose normal. No congestion or rhinorrhea.      Mouth/Throat:      Mouth: Mucous membranes are moist.      Pharynx: Oropharynx is clear.   Eyes:      Extraocular Movements: Extraocular movements intact.      Conjunctiva/sclera: Conjunctivae normal.      Pupils: Pupils are equal, round, and reactive to light.   Neck:      Vascular: No carotid bruit.   Cardiovascular:      Rate and Rhythm: Normal rate and regular rhythm.      Pulses: Normal pulses.      Heart sounds: Normal heart sounds. No murmur heard.  Pulmonary:      Effort: Pulmonary effort is normal. No respiratory distress.      Breath sounds: Normal breath sounds.   Abdominal:      General: Abdomen is flat. " Bowel sounds are normal.      Palpations: Abdomen is soft.      Tenderness: There is no abdominal tenderness.   Musculoskeletal:         General: Normal range of motion.      Cervical back: Normal range of motion and neck supple.      Lumbar back: Tenderness present. No spasms or bony tenderness. Negative right straight leg raise test and negative left straight leg raise test.      Right lower leg: No edema.      Left lower leg: No edema.      Comments: Mild tenderness to palpation diffusely across lower back, no discrete spasm or trigger point   Lymphadenopathy:      Cervical: No cervical adenopathy.   Skin:     General: Skin is warm and dry.   Neurological:      General: No focal deficit present.      Mental Status: She is alert and oriented to person, place, and time.      Sensory: Sensory deficit present.      Deep Tendon Reflexes:      Reflex Scores:       Patellar reflexes are 2+ on the right side and 2+ on the left side.       Achilles reflexes are 2+ on the right side and 2+ on the left side.     Comments: Kelly sensation to light touch along the lateral left lower leg.   Psychiatric:         Mood and Affect: Mood normal.         Thought Content: Thought content normal.         Assessment & Plan  Routine general medical examination at health care facility  Encouraged continued healthy food choices, monitor portions  Discussed strategies to increase physical activity such as chair exercise.  Encouraged strength and resistance training  Orders:    1 Year Follow Up In Primary Care - Wellness Exam; Future    Asymptomatic menopausal state  Proceed with bone density test for surveillance of osteopenia  Orders:    XR DEXA bone density; Future    Encounter for screening mammogram for breast cancer  She wishes to continue breast cancer screening with mammogram  Orders:    BI mammo bilateral screening tomosynthesis; Future    Type 2 diabetes mellitus with diabetic nephropathy, without long-term current use of  insulin  Continue current medications, check A1c with next lab work  Orders:    Basic metabolic panel; Future    Hemoglobin A1c; Future    Type 2 diabetes mellitus with stage 3b chronic kidney disease, without long-term current use of insulin (Multi)    Orders:    XR DEXA bone density; Future    Basic metabolic panel; Future    Hemoglobin A1c; Future    Class 1 obesity due to excess calories with serious comorbidity and body mass index (BMI) of 33.0 to 33.9 in adult         Lumbar spondylosis         Essential hypertension  Blood pressure adequately controlled       Osteopenia, unspecified location    Orders:    XR DEXA bone density; Future    Spinal stenosis of lumbar region, unspecified whether neurogenic claudication present    Orders:    XR lumbar spine 2-3 views; Future    predniSONE (Deltasone) 20 mg tablet; Take 2 tablets (40 mg) by mouth once daily for 5 days.    Left leg numbness  Given prior history of lumbar spondylosis/stenosis, check updated x-ray  Short-term course of prednisone to see if this provides any relief.  Will avoid NSAIDs due to CKD.  Call for any acute changes in symptoms  Orders:    XR lumbar spine 2-3 views; Future    predniSONE (Deltasone) 20 mg tablet; Take 2 tablets (40 mg) by mouth once daily for 5 days.    Leg heaviness    Orders:    XR lumbar spine 2-3 views; Future    predniSONE (Deltasone) 20 mg tablet; Take 2 tablets (40 mg) by mouth once daily for 5 days.            Patient was identified as a fall risk. Risk prevention instructions provided.

## 2025-01-10 NOTE — ASSESSMENT & PLAN NOTE
Orders:    XR DEXA bone density; Future    Basic metabolic panel; Future    Hemoglobin A1c; Future

## 2025-01-13 ENCOUNTER — TELEPHONE (OUTPATIENT)
Dept: PRIMARY CARE | Facility: CLINIC | Age: 85
End: 2025-01-13

## 2025-01-13 ENCOUNTER — APPOINTMENT (OUTPATIENT)
Dept: PHARMACY | Facility: HOSPITAL | Age: 85
End: 2025-01-13
Payer: MEDICARE

## 2025-01-13 DIAGNOSIS — E11.22 TYPE 2 DIABETES MELLITUS WITH STAGE 3B CHRONIC KIDNEY DISEASE, WITHOUT LONG-TERM CURRENT USE OF INSULIN (MULTI): Primary | ICD-10-CM

## 2025-01-13 DIAGNOSIS — N18.32 TYPE 2 DIABETES MELLITUS WITH STAGE 3B CHRONIC KIDNEY DISEASE, WITHOUT LONG-TERM CURRENT USE OF INSULIN (MULTI): Primary | ICD-10-CM

## 2025-01-13 ASSESSMENT — ENCOUNTER SYMPTOMS: DIABETIC ASSOCIATED SYMPTOMS: 0

## 2025-01-13 NOTE — ASSESSMENT & PLAN NOTE
CONTINUE all meds as prescribed  Educate on hypo- and hyperglycemia  DM controlled now, last A1c of 7.0%  Discussed in depth benefits of  Patient Assistance Program ( PAP) to help reduce the cost of selected patient's medication to $0 COPAY. Informed patient that the process may take 2-3 weeks for an outcome. Patient is aware they are responsible for any copay amount, should they decide to fill the medication before approval.  Will send WEARN 340b Eligible prescription to the patient's Preferred  Pharmacy for  PAP:  [facility]  [facility] in 6 months

## 2025-01-13 NOTE — TELEPHONE ENCOUNTER
----- Message from Misty Rincon sent at 1/9/2025  5:39 PM EST -----  A1c improved to 7, keep pending appointment

## 2025-01-13 NOTE — PROGRESS NOTES
[facility] Pharmacy Consult  Lakshmi Trinh is a 84 y.o. female was referred to Clinical Pharmacy Team for a Pharmacy consult.  The patient was referred for their Diabetes.    Referring Provider: Misty Rincon MD    Subjective   Allergies   Allergen Reactions    Nsaids (Non-Steroidal Anti-Inflammatory Drug) Hives       [id number] - [place], [place] - [address] [facility]  [address] [facility] [address]  Phone: [phone] Fax: [phone]    [facility]  [address], [address]  [facility] [address]  Phone: [phone] Fax: [phone]      Diabetes  She presents for her follow-up diabetic visit. She has type 2 diabetes mellitus. Her disease course has been stable. There are no hypoglycemic associated symptoms. There are no diabetic associated symptoms. There are no hypoglycemic complications. Symptoms are stable. There are no diabetic complications. Risk factors for coronary artery disease include diabetes mellitus, dyslipidemia and hypertension. Current diabetic treatment includes oral agent (dual therapy). She is compliant with treatment all of the time. She is following a generally healthy diet. She rarely participates in exercise. An ACE inhibitor/angiotensin II receptor blocker is being taken.     Reported lost 5 lbs    Review of Systems    Objective     There were no vitals taken for this visit.     LAB  Lab Results   Component Value Date    BILITOT 0.6 10/02/2024    CALCIUM 9.7 01/09/2025    CO2 25 01/09/2025     01/09/2025    CREATININE 1.34 (H) 01/09/2025    GLUCOSE 204 (H) 01/09/2025    ALKPHOS 108 10/02/2024    K 4.0 01/09/2025    PROT 6.6 10/02/2024     01/09/2025    AST 14 10/02/2024    ALT 13 10/02/2024    BUN 34 (H) 01/09/2025    ANIONGAP 15 01/09/2025    MG 1.99 12/12/2023    PHOS 4.3 06/05/2023    ALBUMIN 4.3 10/02/2024    LIPASE 53 12/12/2023    GFRF 33 (A) 09/14/2023     Lab Results   Component Value Date    TRIG 245 (H) 10/02/2024    CHOL 166  10/02/2024    LDLCALC 79 10/02/2024    HDL 37.9 10/02/2024     Lab Results   Component Value Date    HGBA1C 7.0 (H) 01/09/2025       Current Outpatient Medications on File Prior to Visit   Medication Sig Dispense Refill    allopurinol (Zyloprim) 300 mg tablet Take 1 tablet (300 mg) by mouth once daily. 90 tablet 3    bumetanide (Bumex) 0.5 mg tablet TAKE ONE TABLET BY MOUTH EVERY MORNING 90 tablet 3    calcitriol (Rocaltrol) 0.25 mcg capsule TAKE 2 CAPSULES BY MOUTH ONCE DAILY OVER THE WEEKEND AND 1 CAPSULE ONCE DAILY DURING THE WEEK 90 capsule 3    cholecalciferol (Vitamin D-3) 50 MCG (2000 UT) tablet Take 1 tablet (50 mcg) by mouth once daily.      dexAMETHasone (Decadron) 2 mg tablet Take 1 tablet (2 mg) by mouth 2 times a day as needed.      empagliflozin (Jardiance) 25 mg Take 1 tablet (25 mg) by mouth once daily. 90 tablet 3    latanoprost (Xalatan) 0.005 % ophthalmic solution Administer 1 drop into both eyes once daily at bedtime.      lisinopril 2.5 mg tablet Take 1 tablet (2.5 mg) by mouth once daily. 90 tablet 1    metFORMIN (Glucophage) 500 mg tablet Take 1 tablet (500 mg) by mouth 2 times a day. 180 tablet 1    nitroglycerin (Nitrostat) 0.4 mg SL tablet Place 1 tablet (0.4 mg) under the tongue every 5 minutes if needed for chest pain. May repeat every 5 minutes for up to 3 doses. 30 tablet 0    predniSONE (Deltasone) 20 mg tablet Take 2 tablets (40 mg) by mouth once daily for 5 days. 10 tablet 0    simvastatin (Zocor) 40 mg tablet Take 1 tablet (40 mg) by mouth once daily at bedtime. 90 tablet 1    timoloL maleate, PF, 0.5 % dropperette Administer 1 drop into both eyes twice a day.      vibegron 75 mg tablet Take 1 tablet (75 mg) by mouth once daily. 90 tablet 3    vitamins A,C,E-zinc-copper (PreserVision AREDS) 4,296 mcg-226 mg-90 mg capsule Take 1 tablet by mouth every 12 hours.      [DISCONTINUED] calcitriol (Rocaltrol) 0.25 mcg capsule Take 1 capsule (0.25 mcg) by mouth once daily. Take 2 capsules by  mouth over the weekend and 1 capsule during the week 90 capsule 3    [DISCONTINUED] empagliflozin (Jardiance) 25 mg Take 1 tablet (25 mg) by mouth once daily. 90 tablet 0     No current facility-administered medications on file prior to visit.        HISTORICAL PHARMACOTHERAPY  -none    DRUG INTERACTIONS  - none    SECONDARY PREVENTION  - Statin? yes  - ACE-I/ARB? yes    Assessment/Plan   Problem List Items Addressed This Visit       Type 2 diabetes mellitus with stage 3b chronic kidney disease, without long-term current use of insulin (Multi) - Primary     CONTINUE all meds as prescribed  Educate on hypo- and hyperglycemia  DM controlled now, last A1c of 7.0%  Discussed in depth benefits of  Patient Assistance Program ( PAP) to help reduce the cost of selected patient's medication to $0 COPAY. Informed patient that the process may take 2-3 weeks for an outcome. Patient is aware they are responsible for any copay amount, should they decide to fill the medication before approval.  Will send WEARN 340b Eligible prescription to the patient's Preferred  Pharmacy for  PAP:  [facility]iance  [facility] in 6 months         Relevant Orders    Referral to Clinical Pharmacy        Continue all meds under the continuation of care with the referring provider and clinical pharmacy team.    Pernell Barrios PharmD     Verbal consent to manage patient's drug therapy was obtained from [the patient and/or an individual authorized to act on behalf of a patient]. They were informed they may decline to participate or withdraw from participation in pharmacy services at any time.

## 2025-01-14 DIAGNOSIS — R29.898 LEG HEAVINESS: ICD-10-CM

## 2025-01-14 DIAGNOSIS — M47.816 LUMBAR SPONDYLOSIS: Primary | Chronic | ICD-10-CM

## 2025-01-15 ENCOUNTER — TELEPHONE (OUTPATIENT)
Dept: PRIMARY CARE | Facility: CLINIC | Age: 85
End: 2025-01-15
Payer: MEDICARE

## 2025-01-15 NOTE — TELEPHONE ENCOUNTER
Spoke with patient and informed of message  He understood and said thank you!  He would like to go to PT in [place]. He said he will call back if he needs any additional information.

## 2025-01-15 NOTE — TELEPHONE ENCOUNTER
----- Message from Misty Rincon sent at 1/14/2025 12:29 PM EST -----  X-ray of the lumbar spine shows degenerative disc changes and arthritic changes.There is mild curvature of the lower lumbar spine.  Recommend physical therapy to see if this helps improve back pain/leg heaviness.  If symptoms do not respond to PT, may need to follow-up with orthopedics or pursue further imaging.  PT order placed

## 2025-01-17 ENCOUNTER — APPOINTMENT (OUTPATIENT)
Dept: PHARMACY | Facility: HOSPITAL | Age: 85
End: 2025-01-17
Payer: MEDICARE

## 2025-01-17 DIAGNOSIS — N39.41 URGE INCONTINENCE: ICD-10-CM

## 2025-01-17 NOTE — Clinical Note
"Pt mentioned occasional itching to outside of vagina - she feels likely due to wearing pads and \"not getting enough air\". She denies symptoms of UTI. We discussed trying Kt barrier cream, which she is going to try. I instructed her that if this does not resolve the issue to follow up with your office. Will touch base with her in a month to see if occasional itching improved "

## 2025-01-17 NOTE — PROGRESS NOTES
"  Patient ID: Lakshmi Trinh is a 84 y.o. female who presents for  Urge incontinence.    Referring Provider: Steph Calero   Pt was referred for Gemtesa cost assistance     Preferred Pharmacy:    [id number] - [place], [place] - [address] [facility]  [address] [facility] [address]  Phone: [phone] Fax: [phone]    [facility]  [address], [address]  [facility] [address]  Phone: [phone] Fax: [phone]      Copay assistance:   Do you have copays on your medications? Yes  Do you have trouble affording your current medications? Yes     Patient Assistance Screening (VAF)    Patient verbally reports monthly or yearly income which is less than 400% federal poverty level   Application for program has been submitted for the following medications:   Gemtesa   Patient has been informed that program team will be reaching out to them to discuss necessary documentation, instructed to answer phone/return voicemail.   Patient aware this process may take up to 6 weeks.   If approved medication must be filled through [facility] pharmacy and may be picked up or mailed to patient.       Subjective      Urinary Symptoms  Medications that may contribute to symptoms:   Bumetanide - discussed taking in the morning to avoid nighttime awakenings - which patient does   Jardiance   Any history of:   Narrow-angle glaucoma? Patient does have glaucoma, unsure of what type   Impaired gastric emptying? No   Urinary retention? No    If diabetes, blood sugar controlled? HbA1c 7.4%  Frequently of bowel movement? Every other day   Tobacco use? No   How many protective undergarments are you utilizing daily? 2 Pads daily compared to 5 pads before Gemtesa   She does mention occasional itching on the outside of vagina. She thinks due to \"not getting air\"/related to moisture from pads. She reports that she uses alcohol on it and it goes away. She denies any burning/sting with urination. Did discuss trying Kt " "moisture protect barrier cream - which she intends to try     What medications have been tried/stopped?   Myrbetriq - didn't work   Current medication? Gemtesa 75 mg daily   When started? October 2024  Side effects? - She reports \"looser\" stools are better, sweating at night improved   Improvement in symptoms? Yes       Cardiovascular Health  The ASCVD Risk score (Princess CASTRO, et al., 2019) failed to calculate for the following reasons:    The 2019 ASCVD risk score is only valid for ages 40 to 79    Risk score cannot be calculated because patient has a medical history suggesting prior/existing ASCVD    Lab Results   Component Value Date    CHOL 166 10/02/2024     Lab Results   Component Value Date    HDL 37.9 10/02/2024     Lab Results   Component Value Date    LDLCALC 79 10/02/2024     Lab Results   Component Value Date    TRIG 245 (H) 10/02/2024     No components found for: \"CHOLHDL\"        Blood Sugar Balance  Lab Results   Component Value Date    GLUCOSE 204 (H) 01/09/2025    HGBA1C 7.0 (H) 01/09/2025    HGBA1C 7.4 (H) 10/02/2024    HGBA1C 7.2 (H) 03/25/2024     No results found for: \"LEPTIN\", \"INSULFAST\", \"GLUF\"      Thyroid  Lab Results   Component Value Date    TSH 4.24 (H) 12/12/2022    FREET4 1.01 12/12/2022       Iron Status  No results found for: \"IRON\", \"TIBC\", \"FERRITIN\"     Kidney Function  Lab Results   Component Value Date    GFRF 33 (A) 09/14/2023    CREATININE 1.34 (H) 01/09/2025       Potassium  Lab Results   Component Value Date    K 4.0 01/09/2025        Vitamin D3  Lab Results   Component Value Date    VITD25 47 03/25/2024       Current Outpatient Medications on File Prior to Visit   Medication Sig Dispense Refill    allopurinol (Zyloprim) 300 mg tablet Take 1 tablet (300 mg) by mouth once daily. 90 tablet 3    bumetanide (Bumex) 0.5 mg tablet TAKE ONE TABLET BY MOUTH EVERY MORNING 90 tablet 3    calcitriol (Rocaltrol) 0.25 mcg capsule TAKE 2 CAPSULES BY MOUTH ONCE DAILY OVER THE WEEKEND AND 1 " CAPSULE ONCE DAILY DURING THE WEEK 90 capsule 3    cholecalciferol (Vitamin D-3) 50 MCG (2000 UT) tablet Take 1 tablet (50 mcg) by mouth once daily.      dexAMETHasone (Decadron) 2 mg tablet Take 1 tablet (2 mg) by mouth 2 times a day as needed.      empagliflozin (Jardiance) 25 mg Take 1 tablet (25 mg) by mouth once daily. 90 tablet 3    latanoprost (Xalatan) 0.005 % ophthalmic solution Administer 1 drop into both eyes once daily at bedtime.      lisinopril 2.5 mg tablet Take 1 tablet (2.5 mg) by mouth once daily. 90 tablet 1    metFORMIN (Glucophage) 500 mg tablet Take 1 tablet (500 mg) by mouth 2 times a day. 180 tablet 1    nitroglycerin (Nitrostat) 0.4 mg SL tablet Place 1 tablet (0.4 mg) under the tongue every 5 minutes if needed for chest pain. May repeat every 5 minutes for up to 3 doses. 30 tablet 0    [] predniSONE (Deltasone) 20 mg tablet Take 2 tablets (40 mg) by mouth once daily for 5 days. 10 tablet 0    simvastatin (Zocor) 40 mg tablet Take 1 tablet (40 mg) by mouth once daily at bedtime. 90 tablet 1    timoloL maleate, PF, 0.5 % dropperette Administer 1 drop into both eyes twice a day.      vibegron 75 mg tablet Take 1 tablet (75 mg) by mouth once daily. 90 tablet 3    vitamins A,C,E-zinc-copper (PreserVision AREDS) 4,296 mcg-226 mg-90 mg capsule Take 1 tablet by mouth every 12 hours.       No current facility-administered medications on file prior to visit.        Medication and allergy reconciliation completed     Drug Interactions   No significant drug interactions identified    Assessment/Plan     Patient is experiencing urinary urgency and incontinence, which has been going on for around two years. She is s/p Botox injection, which she did not feel helped her bladder symptoms. She reports that since starting Gemtesa her symptoms have improved and she is down from using 5 pads/day to only 2 pads/day. Will continue Gemtesa through PAP to continue improving/reducing her urinary urgency and  incontinence. Will follow up with her in a couple weeks to see if her occasional vaginal itching has improved with Kt cream. Patient agreeable with plan.   Has tried before Myrbetriq - didn't work   [facility] Gem[facility] (renewal paperwork faxed in earlier this week)    Gemtesa   Discussed MOA: works by activating beta-3 adrenergic receptors in the bladder resulting in relaxation of the detrusor smooth muscle during the urine storage phase, thus increasing bladder capacity.  Provided education on administration and potential side effects including but not limited to hot flashes <2%, constipation/diarrhea <2%, dry mouth <2%, and headache <4%.   Gemtesa (vibegron) starts working almost immediately - within a few days of first taking it, with noticeable improvements in urinary urgency, frequency, and incontinence noted in clinical trials at 2 weeks which were reported as significant by 12 weeks.    LABS  Lab Results   Component Value Date    GFRF 33 (A) 09/14/2023     GFR 40 mL/min as of 1/9/2025      CONTINUE  Gemtesa 75 mg once daily    Follow-up: 2/14/25 @ 10 am      Time spent with pt: Total length of time 15 (minutes) of the encounter and more than 50% was spent counseling the patient.       Patient Assistance Program Approval:     We are pleased to inform you that your application for assistance has been approved.     This approval is valid through  11/4/2025  as long as the following criteria continue to be satisfied:     Your medication (Gemtesa) remains covered under your current insurance plan.   Your prescriber does not discontinue therapy.   You do not seek reimbursement from any other private or government-funded programs for the  medication.    Under this program, the pharmacy will first bill your insurance plan for your indemnified specified medication. The [employer] Assistance Fund will then offset your copay balance, so that your out-of pocket expense  for your specialty medication will be $0.00.    Marion Yousif, PharmD       Continue all meds under the continuation of care with the referring provider and clinical pharmacy team.    Verbal consent to manage patient's drug therapy was obtained from the patient and/or an individual authorized to act on behalf of a patient. They were informed they may decline to participate or withdraw from participation in pharmacy services at any time.

## 2025-02-06 ENCOUNTER — HOSPITAL ENCOUNTER (OUTPATIENT)
Dept: RADIOLOGY | Facility: CLINIC | Age: 85
Discharge: HOME | End: 2025-02-06
Payer: MEDICARE

## 2025-02-06 VITALS — HEIGHT: 63 IN | WEIGHT: 191 LBS | BODY MASS INDEX: 33.84 KG/M2

## 2025-02-06 DIAGNOSIS — E11.22 TYPE 2 DIABETES MELLITUS WITH STAGE 3B CHRONIC KIDNEY DISEASE, WITHOUT LONG-TERM CURRENT USE OF INSULIN (MULTI): ICD-10-CM

## 2025-02-06 DIAGNOSIS — M85.80 OSTEOPENIA, UNSPECIFIED LOCATION: ICD-10-CM

## 2025-02-06 DIAGNOSIS — N18.32 TYPE 2 DIABETES MELLITUS WITH STAGE 3B CHRONIC KIDNEY DISEASE, WITHOUT LONG-TERM CURRENT USE OF INSULIN (MULTI): ICD-10-CM

## 2025-02-06 DIAGNOSIS — Z78.0 ASYMPTOMATIC MENOPAUSAL STATE: ICD-10-CM

## 2025-02-06 DIAGNOSIS — Z12.31 ENCOUNTER FOR SCREENING MAMMOGRAM FOR BREAST CANCER: ICD-10-CM

## 2025-02-06 PROCEDURE — 77067 SCR MAMMO BI INCL CAD: CPT | Performed by: RADIOLOGY

## 2025-02-06 PROCEDURE — 77080 DXA BONE DENSITY AXIAL: CPT | Performed by: RADIOLOGY

## 2025-02-06 PROCEDURE — 77063 BREAST TOMOSYNTHESIS BI: CPT

## 2025-02-06 PROCEDURE — 77080 DXA BONE DENSITY AXIAL: CPT

## 2025-02-06 PROCEDURE — 77063 BREAST TOMOSYNTHESIS BI: CPT | Performed by: RADIOLOGY

## 2025-02-10 ENCOUNTER — TELEPHONE (OUTPATIENT)
Dept: PRIMARY CARE | Facility: CLINIC | Age: 85
End: 2025-02-10
Payer: MEDICARE

## 2025-02-10 NOTE — TELEPHONE ENCOUNTER
Informed patient of mammogram results  She inquired about Bone density results  I informed her of what was advised  She states she is taking 2000 untis of the Vitamin D   She understood about the Calcium and will trever OTC

## 2025-02-14 ENCOUNTER — APPOINTMENT (OUTPATIENT)
Dept: PHARMACY | Facility: HOSPITAL | Age: 85
End: 2025-02-14
Payer: MEDICARE

## 2025-02-14 DIAGNOSIS — N39.41 URGE INCONTINENCE: ICD-10-CM

## 2025-02-14 NOTE — PROGRESS NOTES
"  Patient ID: Lakshmi Trinh is a 84 y.o. female who presents for Urge incontinence.    Referring Provider: Steph Calero   Pt was referred for Gemtesa cost assistance     Preferred Pharmacy:    [id number] - [place], [place] - [address] [facility]  [address] [facility] [address]  Phone: [phone] Fax: [phone]    [facility]  [address], [address]  [facility] [address]  Phone: [phone] Fax: [phone]      Copay assistance:   Do you have copays on your medications? Yes  Do you have trouble affording your current medications? Yes     Patient Assistance Screening (VAF)    Patient verbally reports monthly or yearly income which is less than 400% federal poverty level   Application for program has been submitted for the following medications:   Gemtesa   Patient has been informed that program team will be reaching out to them to discuss necessary documentation, instructed to answer phone/return voicemail.   Patient aware this process may take up to 6 weeks.   If approved medication must be filled through [facility] pharmacy and may be picked up or mailed to patient.       Subjective      Urinary Symptoms  Medications that may contribute to symptoms:   Bumetanide - discussed taking in the morning to avoid nighttime awakenings - which patient does   Jardiance   Any history of:   Narrow-angle glaucoma? Patient does have glaucoma, unsure of what type   Impaired gastric emptying? No   Urinary retention? No    If diabetes, blood sugar controlled? HbA1c 7.4%  Frequently of bowel movement? Every other day   Tobacco use? No   How many protective undergarments are you utilizing daily? 2 Pads daily compared to 5 pads before Gemtesa   She does mention occasional itching on the outside of vagina in past; denies any currently. She thinks due to \"not getting air\"/related to moisture from pads. Recommended Kt - she has not picked up yet. Also educated that Jardiance can increase risk of " "yeast infections/UTI. Instructed her that should she have any signs of infection to follow up with provider right away.   What medications have been tried/stopped?   Myrbetriq - didn't work   Current medication? Gemtesa 75 mg daily   When started? October 2024  Side effects? Occasional constipation - recommended increasing fiber/water intake and if needed can use Miralax as needed  Improvement in symptoms? Yes       Cardiovascular Health  The ASCVD Risk score (Princess CASTRO, et al., 2019) failed to calculate for the following reasons:    The 2019 ASCVD risk score is only valid for ages 40 to 79    Risk score cannot be calculated because patient has a medical history suggesting prior/existing ASCVD    Lab Results   Component Value Date    CHOL 166 10/02/2024     Lab Results   Component Value Date    HDL 37.9 10/02/2024     Lab Results   Component Value Date    LDLCALC 79 10/02/2024     Lab Results   Component Value Date    TRIG 245 (H) 10/02/2024     No components found for: \"CHOLHDL\"        Blood Sugar Balance  Lab Results   Component Value Date    GLUCOSE 204 (H) 01/09/2025    HGBA1C 7.0 (H) 01/09/2025    HGBA1C 7.4 (H) 10/02/2024    HGBA1C 7.2 (H) 03/25/2024     No results found for: \"LEPTIN\", \"INSULFAST\", \"GLUF\"      Thyroid  Lab Results   Component Value Date    TSH 4.24 (H) 12/12/2022    FREET4 1.01 12/12/2022       Iron Status  No results found for: \"IRON\", \"TIBC\", \"FERRITIN\"     Kidney Function  Lab Results   Component Value Date    GFRF 33 (A) 09/14/2023    CREATININE 1.34 (H) 01/09/2025       Potassium  Lab Results   Component Value Date    K 4.0 01/09/2025        Vitamin D3  Lab Results   Component Value Date    VITD25 47 03/25/2024       Current Outpatient Medications on File Prior to Visit   Medication Sig Dispense Refill    allopurinol (Zyloprim) 300 mg tablet Take 1 tablet (300 mg) by mouth once daily. 90 tablet 3    bumetanide (Bumex) 0.5 mg tablet TAKE ONE TABLET BY MOUTH EVERY MORNING 90 tablet 3    " calcitriol (Rocaltrol) 0.25 mcg capsule TAKE 2 CAPSULES BY MOUTH ONCE DAILY OVER THE WEEKEND AND 1 CAPSULE ONCE DAILY DURING THE WEEK 90 capsule 3    cholecalciferol (Vitamin D-3) 50 MCG (2000 UT) tablet Take 1 tablet (50 mcg) by mouth once daily.      dexAMETHasone (Decadron) 2 mg tablet Take 1 tablet (2 mg) by mouth 2 times a day as needed.      empagliflozin (Jardiance) 25 mg Take 1 tablet (25 mg) by mouth once daily. 90 tablet 3    latanoprost (Xalatan) 0.005 % ophthalmic solution Administer 1 drop into both eyes once daily at bedtime.      lisinopril 2.5 mg tablet Take 1 tablet (2.5 mg) by mouth once daily. 90 tablet 1    metFORMIN (Glucophage) 500 mg tablet Take 1 tablet (500 mg) by mouth 2 times a day. 180 tablet 1    nitroglycerin (Nitrostat) 0.4 mg SL tablet Place 1 tablet (0.4 mg) under the tongue every 5 minutes if needed for chest pain. May repeat every 5 minutes for up to 3 doses. 30 tablet 0    simvastatin (Zocor) 40 mg tablet Take 1 tablet (40 mg) by mouth once daily at bedtime. 90 tablet 1    timoloL maleate, PF, 0.5 % dropperette Administer 1 drop into both eyes twice a day.      vibegron 75 mg tablet Take 1 tablet (75 mg) by mouth once daily. 90 tablet 3    vitamins A,C,E-zinc-copper (PreserVision AREDS) 4,296 mcg-226 mg-90 mg capsule Take 1 tablet by mouth every 12 hours.       No current facility-administered medications on file prior to visit.        Medication and allergy reconciliation completed     Drug Interactions   No significant drug interactions identified    Assessment/Plan     Patient is experiencing urinary urgency and incontinence, which has been going on for around two years. She is s/p Botox injection, which she did not feel helped her bladder symptoms. She reports that since starting Gemtesa her symptoms have improved and she is down from using 5 pads/day to only 2 pads/day. Will continue Gemtesa through [facility] to continue improving/reducing her urinary urgency and incontinence.    Has tried before Myrbetriq - didn't work   Patient has already been approved for  VAF for Jardiance and Gemtesa     Gemtesa   Discussed MOA: works by activating beta-3 adrenergic receptors in the bladder resulting in relaxation of the detrusor smooth muscle during the urine storage phase, thus increasing bladder capacity.  Provided education on administration and potential side effects including but not limited to hot flashes <2%, constipation/diarrhea <2%, dry mouth <2%, and headache <4%.   Gemtesa (vibegron) starts working almost immediately - within a few days of first taking it, with noticeable improvements in urinary urgency, frequency, and incontinence noted in clinical trials at 2 weeks which were reported as significant by 12 weeks.    LABS  Lab Results   Component Value Date    GFRF 33 (A) 09/14/2023     GFR 40 mL/min as of 1/9/2025      CONTINUE  Gemtesa 75 mg once daily    Follow-up: 6/27/25 @ 10:20 am      Time spent with pt: Total length of time 10 (minutes) of the encounter and more than 50% was spent counseling the patient.       Patient Assistance Program Approval:     We are pleased to inform you that your application for assistance has been approved.     This approval is valid through  11/4/2025  as long as the following criteria continue to be satisfied:     Your medication (Gemtesa) remains covered under your current insurance plan.   Your prescriber does not discontinue therapy.   You do not seek reimbursement from any other private or government-funded programs for the  medication.    Under this program, the pharmacy will first bill your insurance plan for your indemnified specified medication. The [employer] Assistance Fund will then offset your copay balance, so that your out-of pocket expense for your specialty medication will be $0.00.    Marion Yousif, PharmD       Continue all meds under the continuation of care with the referring provider and clinical pharmacy team.    Verbal  consent to manage patient's drug therapy was obtained from the patient and/or an individual authorized to act on behalf of a patient. They were informed they may decline to participate or withdraw from participation in pharmacy services at any time.

## 2025-03-07 PROCEDURE — RXMED WILLOW AMBULATORY MEDICATION CHARGE

## 2025-03-11 ENCOUNTER — PHARMACY VISIT (OUTPATIENT)
Dept: PHARMACY | Facility: CLINIC | Age: 85
End: 2025-03-11
Payer: MEDICARE

## 2025-03-20 DIAGNOSIS — I10 ESSENTIAL HYPERTENSION: ICD-10-CM

## 2025-03-20 DIAGNOSIS — E21.3 HYPERPARATHYROIDISM (MULTI): ICD-10-CM

## 2025-03-20 DIAGNOSIS — N18.31 STAGE 3A CHRONIC KIDNEY DISEASE (MULTI): ICD-10-CM

## 2025-03-20 RX ORDER — ALLOPURINOL 300 MG/1
TABLET ORAL
Qty: 90 TABLET | Refills: 3 | Status: SHIPPED | OUTPATIENT
Start: 2025-03-20

## 2025-04-10 DIAGNOSIS — E11.22 TYPE 2 DIABETES MELLITUS WITH STAGE 3B CHRONIC KIDNEY DISEASE, WITHOUT LONG-TERM CURRENT USE OF INSULIN (MULTI): ICD-10-CM

## 2025-04-10 DIAGNOSIS — N18.32 TYPE 2 DIABETES MELLITUS WITH STAGE 3B CHRONIC KIDNEY DISEASE, WITHOUT LONG-TERM CURRENT USE OF INSULIN (MULTI): ICD-10-CM

## 2025-04-10 RX ORDER — METFORMIN HYDROCHLORIDE 500 MG/1
500 TABLET ORAL 2 TIMES DAILY
Qty: 180 TABLET | Refills: 1 | Status: SHIPPED | OUTPATIENT
Start: 2025-04-10

## 2025-04-11 ENCOUNTER — APPOINTMENT (OUTPATIENT)
Dept: NEPHROLOGY | Facility: CLINIC | Age: 85
End: 2025-04-11
Payer: MEDICARE

## 2025-05-06 LAB
ANION GAP SERPL CALCULATED.4IONS-SCNC: 17 MMOL/L (CALC) (ref 7–17)
ANION GAP SERPL CALCULATED.4IONS-SCNC: 18 MMOL/L (CALC) (ref 7–17)
BUN SERPL-MCNC: 43 MG/DL (ref 7–25)
BUN SERPL-MCNC: 44 MG/DL (ref 7–25)
BUN/CREAT SERPL: 31 (CALC) (ref 6–22)
BUN/CREAT SERPL: 31 (CALC) (ref 6–22)
CALCIUM SERPL-MCNC: 9.6 MG/DL (ref 8.6–10.4)
CALCIUM SERPL-MCNC: 9.7 MG/DL (ref 8.6–10.4)
CHLORIDE SERPL-SCNC: 100 MMOL/L (ref 98–110)
CHLORIDE SERPL-SCNC: 99 MMOL/L (ref 98–110)
CO2 SERPL-SCNC: 21 MMOL/L (ref 20–32)
CO2 SERPL-SCNC: 22 MMOL/L (ref 20–32)
CREAT SERPL-MCNC: 1.39 MG/DL (ref 0.6–0.95)
CREAT SERPL-MCNC: 1.43 MG/DL (ref 0.6–0.95)
CREAT UR-MCNC: 16 MG/DL (ref 20–275)
EGFRCR SERPLBLD CKD-EPI 2021: 37 ML/MIN/1.73M2
EGFRCR SERPLBLD CKD-EPI 2021: 38 ML/MIN/1.73M2
EST. AVERAGE GLUCOSE BLD GHB EST-MCNC: 183 MG/DL
EST. AVERAGE GLUCOSE BLD GHB EST-SCNC: 10.1 MMOL/L
GLUCOSE SERPL-MCNC: 136 MG/DL (ref 65–99)
GLUCOSE SERPL-MCNC: 137 MG/DL (ref 65–99)
HBA1C MFR BLD: 8 %
POTASSIUM SERPL-SCNC: 4.7 MMOL/L (ref 3.5–5.3)
POTASSIUM SERPL-SCNC: 4.7 MMOL/L (ref 3.5–5.3)
PROT UR-MCNC: 4 MG/DL (ref 5–24)
PROT/CREAT UR: 0.25 MG/MG CREAT (ref 0.02–0.18)
PROT/CREAT UR: 250 MG/G CREAT (ref 24–184)
PTH-INTACT SERPL-MCNC: 21 PG/ML (ref 16–77)
SODIUM SERPL-SCNC: 138 MMOL/L (ref 135–146)
SODIUM SERPL-SCNC: 139 MMOL/L (ref 135–146)

## 2025-05-07 ENCOUNTER — APPOINTMENT (OUTPATIENT)
Dept: PRIMARY CARE | Facility: CLINIC | Age: 85
End: 2025-05-07
Payer: MEDICARE

## 2025-05-07 ENCOUNTER — APPOINTMENT (OUTPATIENT)
Dept: NEPHROLOGY | Facility: CLINIC | Age: 85
End: 2025-05-07
Payer: MEDICARE

## 2025-05-07 VITALS
DIASTOLIC BLOOD PRESSURE: 69 MMHG | HEART RATE: 73 BPM | SYSTOLIC BLOOD PRESSURE: 103 MMHG | WEIGHT: 189 LBS | BODY MASS INDEX: 33.49 KG/M2 | HEIGHT: 63 IN

## 2025-05-07 VITALS
HEART RATE: 73 BPM | SYSTOLIC BLOOD PRESSURE: 103 MMHG | BODY MASS INDEX: 33.49 KG/M2 | DIASTOLIC BLOOD PRESSURE: 69 MMHG | WEIGHT: 189 LBS | HEIGHT: 63 IN

## 2025-05-07 DIAGNOSIS — N18.31 STAGE 3A CHRONIC KIDNEY DISEASE (MULTI): Primary | ICD-10-CM

## 2025-05-07 DIAGNOSIS — M62.838 MUSCLE SPASM OF RIGHT SHOULDER: ICD-10-CM

## 2025-05-07 DIAGNOSIS — G89.29 CHRONIC RIGHT SHOULDER PAIN: ICD-10-CM

## 2025-05-07 DIAGNOSIS — I10 ESSENTIAL HYPERTENSION: ICD-10-CM

## 2025-05-07 DIAGNOSIS — E11.22 TYPE 2 DIABETES MELLITUS WITH STAGE 3B CHRONIC KIDNEY DISEASE, WITHOUT LONG-TERM CURRENT USE OF INSULIN (MULTI): ICD-10-CM

## 2025-05-07 DIAGNOSIS — G47.9 SLEEP DISORDER: ICD-10-CM

## 2025-05-07 DIAGNOSIS — E21.3 HYPERPARATHYROIDISM (MULTI): ICD-10-CM

## 2025-05-07 DIAGNOSIS — E11.22 TYPE 2 DIABETES MELLITUS WITH STAGE 3 CHRONIC KIDNEY DISEASE, WITHOUT LONG-TERM CURRENT USE OF INSULIN, UNSPECIFIED WHETHER STAGE 3A OR 3B CKD (MULTI): Primary | ICD-10-CM

## 2025-05-07 DIAGNOSIS — I10 ESSENTIAL HYPERTENSION: Chronic | ICD-10-CM

## 2025-05-07 DIAGNOSIS — E78.5 HYPERLIPIDEMIA, UNSPECIFIED HYPERLIPIDEMIA TYPE: ICD-10-CM

## 2025-05-07 DIAGNOSIS — N18.30 TYPE 2 DIABETES MELLITUS WITH STAGE 3 CHRONIC KIDNEY DISEASE, WITHOUT LONG-TERM CURRENT USE OF INSULIN, UNSPECIFIED WHETHER STAGE 3A OR 3B CKD (MULTI): Primary | ICD-10-CM

## 2025-05-07 DIAGNOSIS — R80.8 OTHER PROTEINURIA: ICD-10-CM

## 2025-05-07 DIAGNOSIS — M25.511 CHRONIC RIGHT SHOULDER PAIN: ICD-10-CM

## 2025-05-07 DIAGNOSIS — N18.32 TYPE 2 DIABETES MELLITUS WITH STAGE 3B CHRONIC KIDNEY DISEASE, WITHOUT LONG-TERM CURRENT USE OF INSULIN (MULTI): ICD-10-CM

## 2025-05-07 PROCEDURE — 3074F SYST BP LT 130 MM HG: CPT | Performed by: FAMILY MEDICINE

## 2025-05-07 PROCEDURE — 3078F DIAST BP <80 MM HG: CPT | Performed by: FAMILY MEDICINE

## 2025-05-07 PROCEDURE — G2211 COMPLEX E/M VISIT ADD ON: HCPCS | Performed by: FAMILY MEDICINE

## 2025-05-07 PROCEDURE — 1159F MED LIST DOCD IN RCRD: CPT | Performed by: FAMILY MEDICINE

## 2025-05-07 PROCEDURE — 1159F MED LIST DOCD IN RCRD: CPT | Performed by: INTERNAL MEDICINE

## 2025-05-07 PROCEDURE — 99213 OFFICE O/P EST LOW 20 MIN: CPT | Performed by: INTERNAL MEDICINE

## 2025-05-07 PROCEDURE — 1160F RVW MEDS BY RX/DR IN RCRD: CPT | Performed by: FAMILY MEDICINE

## 2025-05-07 PROCEDURE — 99214 OFFICE O/P EST MOD 30 MIN: CPT | Performed by: FAMILY MEDICINE

## 2025-05-07 PROCEDURE — 1036F TOBACCO NON-USER: CPT | Performed by: INTERNAL MEDICINE

## 2025-05-07 PROCEDURE — 3078F DIAST BP <80 MM HG: CPT | Performed by: INTERNAL MEDICINE

## 2025-05-07 PROCEDURE — 3074F SYST BP LT 130 MM HG: CPT | Performed by: INTERNAL MEDICINE

## 2025-05-07 RX ORDER — LISINOPRIL 2.5 MG/1
2.5 TABLET ORAL DAILY
Qty: 90 TABLET | Refills: 1 | Status: SHIPPED | OUTPATIENT
Start: 2025-05-07

## 2025-05-07 RX ORDER — SIMVASTATIN 40 MG/1
40 TABLET, FILM COATED ORAL NIGHTLY
Qty: 90 TABLET | Refills: 1 | Status: SHIPPED | OUTPATIENT
Start: 2025-05-07

## 2025-05-07 NOTE — PROGRESS NOTES
Subjective   Lakshmi Trinh is a 84 y.o. female who presents for Follow-up and Diabetes.  HPI  This is a diabetes follow up visit for Lakshmi Trinh. The current treatment includes diet, exercise, and oral medications and she is compliant most of the time, les strict with diet, less frequent exercise. Symptoms include none. Glucose is monitored: not checking. she reports poor compliance with a low carbohydrate diet, and exercises infrequently .    Patient is prescribed an ACE/ARB/ARNI medication   Patient is prescribed a STATIN medication  Patient is prescribed a SGLT2/GLP1 medication    Last Flu Vaccine given:            10/01/2011   Last PCV Vaccine given:   08/09/2023    Lab Results   Component Value Date    HGBA1C 8.0 (H) 05/05/2025    CREATININE 1.39 (H) 05/05/2025    LDLCALC 79 10/02/2024        Last Eye Exam: 05/07/25     Liver Screening: FIB-4 Calculation: 1.4 at 10/2/2024 10:08 AM  Calculated from:  SGOT/AST: 14 U/L at 10/2/2024 10:08 AM  SGPT/ALT: 13 U/L at 10/2/2024 10:08 AM  Platelets: 224 x10*3/uL at 10/2/2024 10:08 AM  Age: 83 years    Interpretation: <1.45 Cirrhosis less likely, 1.45 - 3.25 Indeterminate, >3.25 Cirrhosis more likely      L sided neck/shoulder pain-after fall 1 yr ago  R shoulder pain-aching over shoulder blade  Disrupted sleep pain.  In the upper extremities.    Review of Systems  Visit Vitals  /69 (BP Location: Left arm, Patient Position: Sitting)   Pulse 73   Body mass index is 33.48 kg/m².   Objective   Physical Exam  Vitals reviewed.   Constitutional:       Appearance: Normal appearance. She is obese.   HENT:      Head: Normocephalic and atraumatic.      Mouth/Throat:      Mouth: Mucous membranes are moist.   Eyes:      Extraocular Movements: Extraocular movements intact.      Conjunctiva/sclera: Conjunctivae normal.   Neck:     Cardiovascular:      Rate and Rhythm: Normal rate and regular rhythm.      Pulses: Normal pulses.      Heart sounds: Normal heart sounds. No  murmur heard.  Pulmonary:      Effort: Pulmonary effort is normal.      Breath sounds: Normal breath sounds.   Musculoskeletal:      Right shoulder: Tenderness and crepitus present.      Left shoulder: Decreased range of motion.      Cervical back: Neck supple. Spasms and tenderness present. Pain with movement and muscular tenderness present. Decreased range of motion.        Back:       Right lower leg: No edema.      Left lower leg: No edema.   Lymphadenopathy:      Cervical: No cervical adenopathy.   Skin:     General: Skin is warm and dry.   Neurological:      Mental Status: She is alert and oriented to person, place, and time. Mental status is at baseline.   Psychiatric:         Mood and Affect: Mood normal.         Behavior: Behavior normal.         Thought Content: Thought content normal.         Normal BILATERAL diabetic foot exam: Pulses are palpable, sensation is intact, and there are no ulcers or lesions.     Assessment & Plan  Type 2 diabetes mellitus with stage 3 chronic kidney disease, without long-term current use of insulin, unspecified whether stage 3a or 3b CKD (Multi)  Continue metformin and Jardiance.  Recommended additional pharmacotherapy to improve glucose control such as GLP-1 agonist or low-dose sulfonylurea which she declined at present.  Prefers to try make diet lifestyle modification with repeat A1c in 3 months.  Orders:    Basic metabolic panel; Future    Hemoglobin A1c; Future    Chronic right shoulder pain  Trial of massotherapy to help with her muscle spasm  Orders:    Referral to [facility]; Future    Muscle spasm of right shoulder    Orders:    Referral to [facility]; Future    Sleep disorder         Type 2 diabetes mellitus with stage 3b chronic kidney disease, without long-term current use of insulin (Multi)    Orders:    lisinopril 2.5 mg tablet; Take 1 tablet (2.5 mg) by mouth once daily.    Essential hypertension    Orders:    lisinopril 2.5 mg tablet; Take 1  tablet (2.5 mg) by mouth once daily.    Hyperlipidemia, unspecified hyperlipidemia type  , She tolerates without side effect.  Orders:    simvastatin (Zocor) 40 mg tablet; Take 1 tablet (40 mg) by mouth once daily at bedtime.              Misty Rincon MD 05/07/25 12:16 PM             Patient was identified as a fall risk. Risk prevention instructions provided.

## 2025-05-07 NOTE — PATIENT INSTRUCTIONS
Try adding a magnesium supplement 200-400 mg at bedtime.    Try Cognitive behavioral therapy to improve sleep     https://www.veterantraining.va.gov/insomnia/index.asp      Ways to Help Prevent Falls at Home    Quick Tips   ? Ask for help if you need it. Most people want to help!   ? Get up slowly after sitting or laying down   ? Wear a medical alert device or keep cell phone in your pocket   ? Use night lights, especially areas near a bathroom   ? Keep the items you use often within reach on a small stool or end table   ? Use an assistive device such as walker or cane, as directed by provider/physical therapy   ? Use a non-slip mat and grab bars in your bathroom. Look for home health sections for best options     Other Areas to Focus On   ? Exercise and nutrition: Regular exercise or taking a falls prevention class are great ways improve strength and balance. Don’t forget to stay hydrated and bring a snack!   ? Medicine side effects: Some medicines can make you sleepy or dizzy, which could cause a fall. Ask your healthcare provider about the side effects your medicines could cause. Be sure to let them know if you take any vitamins or supplements as well.   ? Tripping hazards: Remove items you could trip on, such as loose mats, rugs, cords, and clutter. Wear closed toe shoes with rubber soles.   ? Health and wellness: Get regular checkups with your healthcare provider, plus routine vision and hearing screenings. Talk with your healthcare provider about:   o Your medicines and the possible side effects - bring them in a bag if that is easier!   o Problems with balance or feeling dizzy   o Ways to promote bone health, such as Vitamin D and calcium supplements   o Questions or concerns about falling     *Ask your healthcare team if you have questions     [facility], 2022

## 2025-05-07 NOTE — PROGRESS NOTES
Lakshmi YAP Ziggy   84 y.o.    @@  N/Room: [id number]/Room/bed info not found    Subjective:   The patient is being seen for a routine clinic follow-up of chronic kidney disease. Recently, the disease has been stable. Disease complications:  No hyperkalemia, no hypocalcemia, no hyperphosphatemia, no metabolic acidosis, no coagulopathy, no uremic encephalopathy, no neuropathy and no renal osteodystrophy. The patient is currently asymptomatic. No associated symptoms are reported.       Meds:   Current Medications[1]       ROS:  The patient is awake and oriented. No dizziness or lightheadedness. No chills and no fever. No headaches. No nausea and no vomiting. No shortness of breath. No cough. No chest pain. No abdominal pain. No diarrhea. No hematemesis or hemoptysis. No hematuria. No rectal bleeding. No melena. No epistaxis. No urinary symptoms. No flank pain. No leg edema. No itching. Overall, the rest of the review of systems is also negative.  12 point review of systems otherwise negative as stated in HPI.        Physical Examination:        Vitals:    05/07/25 1244   BP: 103/69   Pulse: 73     General: The patient is awake, oriented, and is not in any distress.  Head and Neck: Normocephalic. No periorbital edema.  Eyes: non-icteric  Respiratory: Symmetric chest expansion. No respiratory distress.  Skin: No maculopapular rash.  Musculoskeletal: No peripheral edema.  Neuro Exam: Speech is fluent. Moves extremities.    Imaging:  === 01/02/24 ===    US RIGHT UPPER QUADRANT    - Impression -  Incomplete visualization of the pancreas. Otherwise unremarkable.    Signed by: Enrique Hutchinson 1/2/2024 2:44 PM  Dictation workstation:   [id number]       Blood Labs:  No results found for this or any previous visit (from the past 24 hours).   Lab Results   Component Value Date    PTH 21 05/05/2025    PROTUR NEGATIVE 11/13/2024    PHOS 4.3 06/05/2023      Lab Results   Component Value Date    GLUCOSE 136 (H) 05/05/2025    CALCIUM  9.7 05/05/2025     05/05/2025    K 4.7 05/05/2025    CO2 22 05/05/2025    CL 99 05/05/2025    BUN 43 (H) 05/05/2025    CREATININE 1.39 (H) 05/05/2025         Assessment and Plan:    1. Chronic kidney disease stage III.  Her last Cr level is 1.39. Stable kidney function. Electrolytes are normal. Volume status is good.      2. Secondary hyperparathyroidism. On calcitriol with good PTH level.     3. Hypertension. Blood pressure is acceptable. Continue the current medications.     4.  Proteinuria.  She has about 250 mg proteinuria.  This is because of diabetic nephropathy.  Continue lisinopril and Jardiance.    She will be seen in my office in about 6 months for followup.            Jose Juan Dooley MD  Senior Attending Physician  Director of Onco-Nephrology Program  Division of Nephrology & Hypertension  [facility]       [1]   Current Outpatient Medications   Medication Sig Dispense Refill    allopurinol (Zyloprim) 300 mg tablet TAKE ONE TABLET (300 mg) BY MOUTH DAILY 90 tablet 3    bumetanide (Bumex) 0.5 mg tablet TAKE ONE TABLET BY MOUTH EVERY MORNING 90 tablet 3    calcitriol (Rocaltrol) 0.25 mcg capsule TAKE 2 CAPSULES BY MOUTH ONCE DAILY OVER THE WEEKEND AND 1 CAPSULE ONCE DAILY DURING THE WEEK 90 capsule 3    cholecalciferol (Vitamin D-3) 50 MCG (2000 UT) tablet Take 1 tablet (50 mcg) by mouth once daily.      dexAMETHasone (Decadron) 2 mg tablet Take 1 tablet (2 mg) by mouth 2 times a day as needed.      empagliflozin (Jardiance) 25 mg Take 1 tablet (25 mg) by mouth once daily. 90 tablet 3    latanoprost (Xalatan) 0.005 % ophthalmic solution Administer 1 drop into both eyes once daily at bedtime.      lisinopril 2.5 mg tablet Take 1 tablet (2.5 mg) by mouth once daily. 90 tablet 1    metFORMIN (Glucophage) 500 mg tablet TAKE ONE TABLET BY MOUTH TWO TIMES A  tablet 1    nitroglycerin (Nitrostat) 0.4 mg SL tablet Place 1 tablet (0.4 mg) under the tongue every 5 minutes  if needed for chest pain. May repeat every 5 minutes for up to 3 doses. 30 tablet 0    simvastatin (Zocor) 40 mg tablet Take 1 tablet (40 mg) by mouth once daily at bedtime. 90 tablet 1    timoloL maleate, PF, 0.5 % dropperette Administer 1 drop into both eyes twice a day.      vibegron 75 mg tablet Take 1 tablet (75 mg) by mouth once daily. 90 tablet 3    vitamins A,C,E-zinc-copper (PreserVision AREDS) 4,296 mcg-226 mg-90 mg capsule Take 1 tablet by mouth every 12 hours.       No current facility-administered medications for this visit.

## 2025-05-07 NOTE — ASSESSMENT & PLAN NOTE
, She tolerates without side effect.  Orders:    simvastatin (Zocor) 40 mg tablet; Take 1 tablet (40 mg) by mouth once daily at bedtime.

## 2025-05-07 NOTE — ASSESSMENT & PLAN NOTE
Trial of massotherapy to help with her muscle spasm  Orders:    Referral to [facility]; Future

## 2025-05-08 ENCOUNTER — TELEPHONE (OUTPATIENT)
Dept: NEPHROLOGY | Facility: CLINIC | Age: 85
End: 2025-05-08
Payer: MEDICARE

## 2025-05-08 NOTE — TELEPHONE ENCOUNTER
----- Message from Jose Juan Dooley sent at 5/7/2025 10:04 AM EDT -----  Kidney function remains diminished but it is stable with no major change.    ----- Message -----  From: Tayler Jerez Results In  Sent: 5/6/2025  12:17 PM EDT  To: Jose Juan Dooley MD

## 2025-05-12 ENCOUNTER — APPOINTMENT (OUTPATIENT)
Dept: PRIMARY CARE | Facility: CLINIC | Age: 85
End: 2025-05-12
Payer: MEDICARE

## 2025-05-14 ENCOUNTER — OFFICE VISIT (OUTPATIENT)
Dept: ORTHOPEDIC SURGERY | Facility: CLINIC | Age: 85
End: 2025-05-14
Payer: MEDICARE

## 2025-05-14 ENCOUNTER — HOSPITAL ENCOUNTER (OUTPATIENT)
Dept: RADIOLOGY | Facility: CLINIC | Age: 85
Discharge: HOME | End: 2025-05-14
Payer: MEDICARE

## 2025-05-14 ENCOUNTER — APPOINTMENT (OUTPATIENT)
Dept: UROLOGY | Facility: CLINIC | Age: 85
End: 2025-05-14
Payer: MEDICARE

## 2025-05-14 DIAGNOSIS — M17.11 PRIMARY OSTEOARTHRITIS OF RIGHT KNEE: ICD-10-CM

## 2025-05-14 DIAGNOSIS — G89.29 CHRONIC RIGHT SHOULDER PAIN: ICD-10-CM

## 2025-05-14 DIAGNOSIS — M25.561 CHRONIC PAIN OF RIGHT KNEE: ICD-10-CM

## 2025-05-14 DIAGNOSIS — M25.561 CHRONIC PAIN OF RIGHT KNEE: Primary | ICD-10-CM

## 2025-05-14 DIAGNOSIS — M25.511 CHRONIC RIGHT SHOULDER PAIN: ICD-10-CM

## 2025-05-14 DIAGNOSIS — G89.29 CHRONIC PAIN OF RIGHT KNEE: Primary | ICD-10-CM

## 2025-05-14 DIAGNOSIS — G89.29 CHRONIC PAIN OF RIGHT KNEE: ICD-10-CM

## 2025-05-14 PROCEDURE — 73030 X-RAY EXAM OF SHOULDER: CPT | Mod: RIGHT SIDE | Performed by: RADIOLOGY

## 2025-05-14 PROCEDURE — 99204 OFFICE O/P NEW MOD 45 MIN: CPT | Performed by: PHYSICAL MEDICINE & REHABILITATION

## 2025-05-14 PROCEDURE — 1159F MED LIST DOCD IN RCRD: CPT | Performed by: PHYSICAL MEDICINE & REHABILITATION

## 2025-05-14 PROCEDURE — 1160F RVW MEDS BY RX/DR IN RCRD: CPT | Performed by: PHYSICAL MEDICINE & REHABILITATION

## 2025-05-14 PROCEDURE — 73562 X-RAY EXAM OF KNEE 3: CPT | Mod: RIGHT SIDE | Performed by: RADIOLOGY

## 2025-05-14 PROCEDURE — 1036F TOBACCO NON-USER: CPT | Performed by: PHYSICAL MEDICINE & REHABILITATION

## 2025-05-14 PROCEDURE — 99214 OFFICE O/P EST MOD 30 MIN: CPT | Performed by: PHYSICAL MEDICINE & REHABILITATION

## 2025-05-14 PROCEDURE — 73562 X-RAY EXAM OF KNEE 3: CPT | Mod: RT

## 2025-05-14 PROCEDURE — 73030 X-RAY EXAM OF SHOULDER: CPT | Mod: RT

## 2025-05-14 SDOH — SOCIAL STABILITY: SOCIAL NETWORK: SOCIAL ACTIVITY:: 0

## 2025-05-14 NOTE — PROGRESS NOTES
New Consult/New Patient Note    5/14/2025     Assessment:  This is a very pleasant 84-year-old female with a history of diabetes, CKD, and NSAID allergy who presents for evaluation of right knee pain and right shoulder pain. History, physical, and imaging indicates right knee osteoarthritis as main  of pain today.  - Last hemoglobin A1c was 8-she understands we need this to be under better control, below 8 for further corticosteroid injections  - Right knee osteoarthritis  - Right glenohumeral osteoarthritis      PLAN:  1)  Imaging/Diagnostic Studies: Obtained and interpreted x-ray of right knee today which shows subchondral sclerosis, osteophytosis, and medial joint line narrowing consistent with her physical examination. We will also obtain right shoulder x-ray today.   2)  Therapy/Rehabilitation: She is deferring additional physical therapy today, both aquatic or land-based for her right knee.  I did provide referral for right shoulder physical therapy today but doubtful she will attend.   3)  Pharmacological Management: Significantly limited by her allergy to NSAIDs and recent A1c (5/5/25 8.0). I did advise her that to pursue any injections of her right knee her A1c would need to be at least under 8.   4)  Spine/Surgical Interventions: None at this time.   5)  Alternative Treatments: May consider alternative treatment options in the future including manipulation (chiropractor versus osteopathic) and/or acupuncture if patient does not obtain optimal relief with initial treatment plan.  6)  Consultations:  Physical therapy referral given for right shoulder today.   7)  Follow-up: 4-6 weeks or PRN if symptoms worsen/do not improve.   8)  Future treatment considerations: Goal is to avoid surgery as long as possible; will reevaluate at follow up and if A1c under better control could consider steroid injection of right knee. Consider genicular nerve block as well should steroid injection be contraindicated by co  morbidities.  Consider Synvisc injections for right knee.    Patient advised of the difference between hurt and harm and advised to continue with all normal activities and exercises. Patient verbalized understanding of the above plan and was happy with the care provided.      The above clinical summary has been dictated with voice recognition software. It has not been proofread for grammatical errors, typographical mistakes, or other semantic inconsistencies.    Thank you for visiting our office today. It was our pleasure to take part in your healthcare.     Do not hesitate to call with any questions regarding your plan of care after leaving at [phone]    To clinicians, thank you very much for this kind referral. It is a privilege to partner with you in the care of your patients. My office would be delighted to assist you with any further consultations or with questions regarding the plan of care outlined. Do not hesitate to call the office or contact me directly.     Sincerely,    Patient seen and discussed with attending. Note is not finalized until signed by attending physician.     Matt Morton, DO PGY3  Physical Medicine & Rehabilitation     Seen with resident Dr. Morton, note above and below is a joint effort in all areas.    I saw and evaluated the patient. I personally obtained the key and critical portions of the history and physical exam. I reviewed the resident's documentation and discussed the patient with the resident. I agree with the resident's medical decision making as documented in the resident's note, with my additions.    VINEET Condon MD  , Physical Medicine and Rehabilitation, Orthopedic Spine  [facility]  [facility] [place]         Lakshmi Trinh   is a 84 y.o. female with a history of CKD III who presents with left knee pain. Has been present for the past 8 months. She fell down while watering her lawn  "landing on her left side. Went to ED; workup indicated soft tissue injury. Arm still hurts (has hx of frozen shoulder). Was not having pain in right knee immediately but noticed it a few days later. Legs feel heavy which limits her and thinks her balance is off; describes it as legs feeling tired \"doesn't feel right\". No morning stiffness. No back pain or pain that radiates down legs. Has never had injections for right knee. Left knee replacement 6/2017, left hip replacement 9/2015. Last A1c 8.0.     Location: right supero-lateral aspect of knee   Radiation: no radiation   Quality: sharp current 0/10,  at its worst 9/10   Exacerbated by activity, walking  Relieved by rest   Onset, traumatic event: fall   Has tried:       Pain Disability Index  Family/Home Responsibilities:: 0  Recreation:: 0  Social Activity:: 0  Occupation:: 0  Sexual Behavior:: 0  Self Care:: 0  Life-Support Activities:: 0  Pain Disability Index Scoring  Pain Disability Index Total Score: 0      Valsalva sign is negative  Grocery cart sign is not applicable   Smoker:  no  Does not wake them at night  Litigation: negative     Patient denies new bowel incontinence although has had chronic issues with bladder incontinence, denies fever, denies unintentional weight loss, denies clumsiness of hands, feet, or dropping things.  Denies any constitutional or myelopathic symptomatology.      PREVIOUS TREATMENTS  IN THE LAST SIX MONTHS     Active conservative therapy  in the last six months (see below)              1. Physical therapy:  none                                                                                   2. Home exercise program after PT: yes every morning >6 months                                                   3. A physician supervised home exercise program (HEP):  none               4. Chiropractic Care:   no                                                                   Passive conservative therapy  in the last six months (see " below)              1. NSAIDS:  ALLERGIC, CANNOT TOLERATE.                                                                                                          2. Prescription pain medication: none                                                             3. Acupuncture: no                                                                                            4. Tens unit:  no     Assistive Devices: uses cane going up steps     Work status: retired       ROS: Other than listed in HPI, PMHX below, and intake paperwork including a 30 point patient-recorded review of symptoms which was personally reviewed and inclusive of no history of unintentional weight loss, change in appetite, significant malaise, fevers, chills, or change in bowel/bladder, shortness of breath, or chest pain.    I have confirmed and edited as necessary Past Medical, Past Surgical, Family, Social History and ROS as obtained by others. These were also obtained on new patient forms.      PHYSICAL EXAM:   GENERAL APPEARANCE:  Well nourished, well developed, and no apparent distress.  NEURO PSYCH: Patient oriented to person, place, Mood pleasant. Benign affect.  MUSCULOSKELETAL and NEUROLOGICAL       VISUAL INSPECTION             RIGHT KNEE: Bony hypertrophy. No erythema or joint  effusion.  Tenderness with medial joint line palpation and tenderness along the superior lateral aspect of knee.    LEFT KNEE: Midline TKA scar.   KNEE RANGE OF MOTION: Full right knee flexion.   MUSCLE TONE: Normal in the bilateral lower extremities.   MOTOR: 4+/5 bilateral hip flexors.  5/5 bilateral hip abduction and hip adduction.  5/5 right knee flexion and extension; 4+/5 left knee extension and flexion.  4+/5 left EHL; 5/5 right EHL.  SENSORY: Normal sensory exam to light touch  GAIT: Slow, nonlimping.  Unable to complete tandem gait today.  REFLEXES: +2 to bilateral lower extremities in both patellar and Achilles reflex.  STRAIGHT LEG TEST: Negative straight  leg testing bilaterally in the supine position.  PERIPHERAL JOINT ROM:   HIP ROM: Full hip range of motion bilaterally.  Full hip flexion bilaterally without pain.  AJAY/FADIR: Some tenderness with FADIR testing left hip.  Some back pain with right AJAY testing.  RIGHT KNEE: Some tenderness with Sapna's testing.  Negative reverse Sapna.  Negative Lachman test.  Positive patellar grind test.  Negative varus and valgus testing.    DATA REVIEW:   The below imaging studies were personally reviewed and discussed with the patient.    Medical Decision Making:  The above note constitutes a Moderate to High level of medical decision making based on past data and imaging review, new and chronic symptoms with exacerbation, change in weakness or sensation, new imaging and diagnostic studies ordered, discussion of potential interventional or surgical treatment options, acute or chronic pain that may pose a threat to bodily function.    Medical History[1]    Medication Documentation Review Audit       Reviewed by Saad Condon MD (Physician) on 05/14/25 at 0946      Medication Order Taking? Sig Documenting Provider Last Dose Status   allopurinol (Zyloprim) 300 mg tablet [id number]  TAKE ONE TABLET (300 mg) BY MOUTH DAILY Jose Juan Dooley MD  Active   bumetanide (Bumex) 0.5 mg tablet [id number]  TAKE ONE TABLET BY MOUTH EVERY MORNING Jose Juan Dooley MD  Active   calcitriol (Rocaltrol) 0.25 mcg capsule [id number]  TAKE 2 CAPSULES BY MOUTH ONCE DAILY OVER THE WEEKEND AND 1 CAPSULE ONCE DAILY DURING THE WEEK Jose Juan Dooley MD  Active   cholecalciferol (Vitamin D-3) 50 MCG (2000 UT) tablet [id number]  Take 1 tablet (50 mcg) by mouth once daily. Historical Provider, MD  Active   dexAMETHasone (Decadron) 2 mg tablet [id number]  Take 1 tablet (2 mg) by mouth 2 times a day as needed. Historical Provider, MD  Active   empagliflozin (Jardiance) 25 mg [id number]  Take 1 tablet (25 mg) by mouth once daily. Misty Rincon MD  Active    latanoprost (Xalatan) 0.005 % ophthalmic solution [id number]  Administer 1 drop into both eyes once daily at bedtime. Historical Provider, MD  Active   lisinopril 2.5 mg tablet [id number]  Take 1 tablet (2.5 mg) by mouth once daily. Misty Rincon MD  Active   metFORMIN (Glucophage) 500 mg tablet [id number]  TAKE ONE TABLET BY MOUTH TWO TIMES A DAY Misty Rincon MD  Active   nitroglycerin (Nitrostat) 0.4 mg SL tablet [id number]  Place 1 tablet (0.4 mg) under the tongue every 5 minutes if needed for chest pain. May repeat every 5 minutes for up to 3 doses. Misty Rincon MD   25 2359   simvastatin (Zocor) 40 mg tablet [id number]  Take 1 tablet (40 mg) by mouth once daily at bedtime. Misty Rincon MD  Active   timoloL maleate, PF, 0.5 % dropperette [id number]  Administer 1 drop into both eyes twice a day. Historical Provider, MD  Active   vibegron 75 mg tablet [id number]  Take 1 tablet (75 mg) by mouth once daily. Steph Calero, APRN-CNP  Active   vitamins A,C,E-zinc-copper (PreserVision AREDS) 4,296 mcg-226 mg-90 mg capsule [id number]  Take 1 tablet by mouth every 12 hours. Historical Provider, MD  Active                    RX Allergies[2]    Social History     Socioeconomic History    Marital status:      Spouse name: Not on file    Number of children: Not on file    Years of education: Not on file    Highest education level: Not on file   Occupational History    Not on file   Tobacco Use    Smoking status: Former     Current packs/day: 0.00     Types: Cigarettes     Quit date:      Years since quittin.3    Smokeless tobacco: Never   Vaping Use    Vaping status: Never Used   Substance and Sexual Activity    Alcohol use: Yes     Comment: 1/2 beer per week.    Drug use: Never    Sexual activity: Not on file   Other Topics Concern    Not on file   Social History Narrative    Not on file     Social Drivers of Health     Financial Resource Strain: Low Risk  (2023)    Overall  Financial Resource Strain (CARDIA)     Difficulty of Paying Living Expenses: Not hard at all   Food Insecurity: Not on file   Transportation Needs: No Transportation Needs (12/12/2023)    PRAPARE - Transportation     Lack of Transportation (Medical): No     Lack of Transportation (Non-Medical): No   Physical Activity: Not on file   Stress: Not on file   Social Connections: Not on file   Intimate Partner Violence: Not on file   Housing Stability: Low Risk  (12/12/2023)    Housing Stability Vital Sign     Unable to Pay for Housing in the Last Year: No     Number of Places Lived in the Last Year: 1     Unstable Housing in the Last Year: No       Surgical History[3]         [1]   Past Medical History:  Diagnosis Date    DVT, lower extremity 09/28/2015    Encounter for other preprocedural examination     Preop examination    Encounter for screening for malignant neoplasm of colon     Colon cancer screening    Encounter for screening for malignant neoplasm of skin     Skin cancer screening    Morbid (severe) obesity due to excess calories (Multi) 04/01/2022    Class 2 severe obesity with serious comorbidity and body mass index (BMI) of 35.0 to 35.9 in adult    Personal history of other benign neoplasm     History of uterine leiomyoma    Status post left hip replacement 09/02/2015    Status post total left knee replacement 06/15/2017   [2]   Allergies  Allergen Reactions    Nsaids (Non-Steroidal Anti-Inflammatory Drug) Hives   [3]   Past Surgical History:  Procedure Laterality Date    HYSTERECTOMY  age 32    OTHER SURGICAL HISTORY  09/28/2021    Hip surgery    OTHER SURGICAL HISTORY  09/28/2021    Hysterectomy    OTHER SURGICAL HISTORY  08/18/2022    Knee surgery    OTHER SURGICAL HISTORY  01/21/2022    Esophagogastroduodenoscopy    OTHER SURGICAL HISTORY  02/22/2022    Complete colonoscopy

## 2025-06-12 ENCOUNTER — OFFICE VISIT (OUTPATIENT)
Dept: PRIMARY CARE | Facility: CLINIC | Age: 85
End: 2025-06-12
Payer: MEDICARE

## 2025-06-12 VITALS
DIASTOLIC BLOOD PRESSURE: 74 MMHG | TEMPERATURE: 96.4 F | HEIGHT: 63 IN | WEIGHT: 189 LBS | HEART RATE: 76 BPM | SYSTOLIC BLOOD PRESSURE: 117 MMHG | BODY MASS INDEX: 33.49 KG/M2

## 2025-06-12 DIAGNOSIS — R21 DIFFUSE PAPULAR RASH: Primary | ICD-10-CM

## 2025-06-12 PROCEDURE — 3078F DIAST BP <80 MM HG: CPT | Performed by: FAMILY MEDICINE

## 2025-06-12 PROCEDURE — 1159F MED LIST DOCD IN RCRD: CPT | Performed by: FAMILY MEDICINE

## 2025-06-12 PROCEDURE — 99213 OFFICE O/P EST LOW 20 MIN: CPT | Performed by: FAMILY MEDICINE

## 2025-06-12 PROCEDURE — 1036F TOBACCO NON-USER: CPT | Performed by: FAMILY MEDICINE

## 2025-06-12 PROCEDURE — G2211 COMPLEX E/M VISIT ADD ON: HCPCS | Performed by: FAMILY MEDICINE

## 2025-06-12 PROCEDURE — 3074F SYST BP LT 130 MM HG: CPT | Performed by: FAMILY MEDICINE

## 2025-06-12 RX ORDER — MUPIROCIN 20 MG/G
OINTMENT TOPICAL
Qty: 22 G | Refills: 0 | Status: SHIPPED | OUTPATIENT
Start: 2025-06-12 | End: 2025-06-19

## 2025-06-12 RX ORDER — HYDROCORTISONE 25 MG/G
CREAM TOPICAL 2 TIMES DAILY PRN
Qty: 453.6 G | Refills: 0 | Status: SHIPPED | OUTPATIENT
Start: 2025-06-12 | End: 2026-06-12

## 2025-06-12 ASSESSMENT — ENCOUNTER SYMPTOMS
CHILLS: 0
FATIGUE: 0

## 2025-06-12 ASSESSMENT — PATIENT HEALTH QUESTIONNAIRE - PHQ9
2. FEELING DOWN, DEPRESSED OR HOPELESS: SEVERAL DAYS
1. LITTLE INTEREST OR PLEASURE IN DOING THINGS: NOT AT ALL
SUM OF ALL RESPONSES TO PHQ9 QUESTIONS 1 AND 2: 1

## 2025-06-12 NOTE — PROGRESS NOTES
"Subjective   Patient ID: Lakshmi Trinh is a 84 y.o. female who presents for Rash (C/o rash since 5/17 states she had a massage with carrot and avocado oil thinks it may be related.).    HPI   Here today with concern of rash-onset of red pinpoint dots on upper thighs 1 month ago.  Progressed to involve upper back, then chest and arms.  Appeared 3 to 4 days after she had a massage where they used avocado and carrot oil.  She does not have any known prior history of allergy to avocados or carrot.  Denies any excessive itching or pain from the rash.  Several lesions have slight blisterlike appearance.  No other new lotions soaps or detergents.  No new medications.  Is most bothered by the rough appearance of her skin.  Review of Systems   Constitutional:  Negative for chills and fatigue.   Skin:  Positive for rash.       Objective   /74 (BP Location: Left arm, Patient Position: Sitting)   Pulse 76   Temp 35.8 °C (96.4 °F)   Ht 1.6 m (5' 3\")   Wt 85.7 kg (189 lb)   BMI 33.48 kg/m²     Physical Exam  Vitals reviewed.   Constitutional:       General: She is not in acute distress.     Appearance: Normal appearance. She is obese. She is not ill-appearing.   Cardiovascular:      Heart sounds: Normal heart sounds. No murmur heard.  Pulmonary:      Effort: Pulmonary effort is normal. No respiratory distress.      Breath sounds: Normal breath sounds.   Skin:     General: Skin is warm and dry.      Findings: Rash present. Rash is papular.      Comments: Pinpoint parafollicular erythematous papular rash on the upper chest, upper arms, proximal thighs, upper back.  Lesion on left forearm with slight pustular nature and lesion on right thigh with pustular nature.   Neurological:      Mental Status: She is alert.         Assessment/Plan   Assessment & Plan  Diffuse papular rash  Appearance appears most consistent with folliculitis, suspect irritation or inflammation related to massage.  Try low potency corticosteroid " topically to help reduce inflammation and irritation.  Use mupirocin topically to any of the pustular lesions  If symptoms persist, may benefit from Hibiclens or oral antibiotic therapy.  She will call with update early next week  Orders:    hydrocortisone 2.5 % cream; Apply topically 2 times a day as needed for irritation or rash.    mupirocin (Bactroban) 2 % ointment; Apply small amount to affected area twice daily

## 2025-06-16 PROCEDURE — RXMED WILLOW AMBULATORY MEDICATION CHARGE

## 2025-06-17 ENCOUNTER — TELEPHONE (OUTPATIENT)
Dept: PRIMARY CARE | Facility: CLINIC | Age: 85
End: 2025-06-17

## 2025-06-17 ENCOUNTER — PHARMACY VISIT (OUTPATIENT)
Dept: PHARMACY | Facility: CLINIC | Age: 85
End: 2025-06-17
Payer: MEDICARE

## 2025-06-17 ENCOUNTER — APPOINTMENT (OUTPATIENT)
Dept: UROLOGY | Facility: CLINIC | Age: 85
End: 2025-06-17
Payer: MEDICARE

## 2025-06-17 VITALS
BODY MASS INDEX: 33.66 KG/M2 | DIASTOLIC BLOOD PRESSURE: 67 MMHG | SYSTOLIC BLOOD PRESSURE: 112 MMHG | HEART RATE: 89 BPM | TEMPERATURE: 97.8 F | HEIGHT: 63 IN | WEIGHT: 190 LBS

## 2025-06-17 DIAGNOSIS — N39.41 URGE INCONTINENCE OF URINE: Primary | ICD-10-CM

## 2025-06-17 LAB
POC APPEARANCE, URINE: CLEAR
POC BILIRUBIN, URINE: NEGATIVE
POC BLOOD, URINE: NEGATIVE
POC COLOR, URINE: YELLOW
POC GLUCOSE, URINE: ABNORMAL MG/DL
POC KETONES, URINE: NEGATIVE MG/DL
POC LEUKOCYTES, URINE: ABNORMAL
POC NITRITE,URINE: NEGATIVE
POC PH, URINE: 5 PH
POC PROTEIN, URINE: NEGATIVE MG/DL
POC SPECIFIC GRAVITY, URINE: 1.01
POC UROBILINOGEN, URINE: 0.2 EU/DL

## 2025-06-17 PROCEDURE — 81003 URINALYSIS AUTO W/O SCOPE: CPT | Performed by: NURSE PRACTITIONER

## 2025-06-17 PROCEDURE — G2211 COMPLEX E/M VISIT ADD ON: HCPCS | Performed by: NURSE PRACTITIONER

## 2025-06-17 PROCEDURE — 99213 OFFICE O/P EST LOW 20 MIN: CPT | Performed by: NURSE PRACTITIONER

## 2025-06-17 PROCEDURE — 1036F TOBACCO NON-USER: CPT | Performed by: NURSE PRACTITIONER

## 2025-06-17 PROCEDURE — 51798 US URINE CAPACITY MEASURE: CPT | Performed by: NURSE PRACTITIONER

## 2025-06-17 PROCEDURE — 3078F DIAST BP <80 MM HG: CPT | Performed by: NURSE PRACTITIONER

## 2025-06-17 PROCEDURE — 3074F SYST BP LT 130 MM HG: CPT | Performed by: NURSE PRACTITIONER

## 2025-06-17 PROCEDURE — 1159F MED LIST DOCD IN RCRD: CPT | Performed by: NURSE PRACTITIONER

## 2025-06-17 RX ORDER — TIMOLOL MALEATE 5 MG/ML
1 SOLUTION/ DROPS OPHTHALMIC 2 TIMES DAILY
COMMUNITY
Start: 2025-05-07

## 2025-06-17 ASSESSMENT — PATIENT HEALTH QUESTIONNAIRE - PHQ9
1. LITTLE INTEREST OR PLEASURE IN DOING THINGS: SEVERAL DAYS
2. FEELING DOWN, DEPRESSED OR HOPELESS: SEVERAL DAYS
SUM OF ALL RESPONSES TO PHQ9 QUESTIONS 1 AND 2: 2

## 2025-06-17 NOTE — TELEPHONE ENCOUNTER
Patient says that the rash is not any better. She has been using cream as prescribed and is hoping you have an idea of what else to do.

## 2025-06-17 NOTE — PATIENT INSTRUCTIONS
Plan:   Continue Gemtesa 75 mg daily, 99% improvement  Working with pharmacy assistance program  Brochures sindhu Mcdonnell given    Follow up 1 year, UA , PVR  Nurse line [phone]

## 2025-06-17 NOTE — PROGRESS NOTES
06/17/25   [id number]    Follow up [facility]     Subjective      HPI Lakshmi Trinh is a 84 y.o. female who presents for follow up urinary symptoms; last seen 11/13/24; no longer Ang darden gaver her 99% improvement. Working with [facility] pharmacist in  clinical pharmacy program;     States using only 2 pads/day and 1 pad for 12 hours at night compared to previously using 5 pads/day. Not interested in PTNS or procedures at this time, not interested neuromodulation at this time; would happy to review and consider;     UA trac leuk, PVR 0 ml  No UTIs, no hematuria    5/5/25 creatinine 1.43, GFR 44 similar range as before    Botox 100 units 7/26/22 w Dr. Rojas  Didn't feel helped    Also tried Myrbetriq 50 mg in  [id number]      Glaucoma, sciatica up last night w pain  Past Medical History:   Diagnosis Date    DVT, lower extremity 09/28/2015    Encounter for other preprocedural examination     Preop examination    Encounter for screening for malignant neoplasm of colon     Colon cancer screening    Encounter for screening for malignant neoplasm of skin     Skin cancer screening    Morbid (severe) obesity due to excess calories (Multi) 04/01/2022    Class 2 severe obesity with serious comorbidity and body mass index (BMI) of 35.0 to 35.9 in adult    Personal history of other benign neoplasm     History of uterine leiomyoma    Status post left hip replacement 09/02/2015    Status post total left knee replacement 06/15/2017     Past Surgical History:   Procedure Laterality Date    HYSTERECTOMY  age 32    OTHER SURGICAL HISTORY  09/28/2021    Hip surgery    OTHER SURGICAL HISTORY  09/28/2021    Hysterectomy    OTHER SURGICAL HISTORY  08/18/2022    Knee surgery    OTHER SURGICAL HISTORY  01/21/2022    Esophagogastroduodenoscopy    OTHER SURGICAL HISTORY  02/22/2022    Complete colonoscopy     Family History   Problem Relation Name Age of Onset    Stroke Mother      Heart attack Father       Social History     Tobacco Use     "Smoking status: Former     Current packs/day: 0.00     Types: Cigarettes     Quit date:      Years since quittin.4    Smokeless tobacco: Never   Vaping Use    Vaping status: Never Used   Substance Use Topics    Alcohol use: Yes     Comment: 1/2 beer per week.    Drug use: Never         Objective     /67   Pulse 89   Temp 36.6 °C (97.8 °F)   Ht 1.6 m (5' 3\")   Wt 86.2 kg (190 lb)   BMI 33.66 kg/m²    Physical Exam  General: Appears comfortable and in no apparent distress, well nourished  Head: Normocephalic, atraumatic  Neck: trachea midline  Respiratory: respirations unlabored, no wheezes, and no use of accessory muscles  Cardiovascular: at rest no dyspnea, well perfused  Skin: no visible rashes or lesions  Neurologic: grossly intact, oriented to person, place, and time  Psychiatric: mood and affect appropriate  Musculoskeletal: in chair for appt. no difficulty w upper body movement, using cane now with sciatica    Assessment/Plan   Problem List Items Addressed This Visit    None      No orders of the defined types were placed in this encounter.  Plan:   Continue Gemtesa 75 mg daily, 99% improvement  Working with pharmacy assistance program  Brochures Revi, axonics given    Follow up 1 year, UA , PVR  Nurse line [phone]       DEJA Gallegos-CNP  Lab Results   Component Value Date    GLUCOSE 136 (H) 2025    CALCIUM 9.7 2025     2025    K 4.7 2025    CO2 22 2025    CL 99 2025    BUN 43 (H) 2025    CREATININE 1.39 (H) 2025       "

## 2025-06-18 NOTE — TELEPHONE ENCOUNTER
Yes she has a few new lesions, no itching at all but has spread to her stomach. She stated it is not getting better.

## 2025-06-19 NOTE — TELEPHONE ENCOUNTER
Called and spoke with patient.  She reports that rash continues to appear the same, some newer lesions on abdomen.  No associated itching, burning, pain.  No change in appearance of rash on areas that she applied topical steroid.  Has tried topical Benadryl x 1 with no change.  Continues to feel skin is slightly rough to touch.  Suspect this is macular papular viral exanthem.  Provided reassurance that rash will gradually deejay on its own.  As long the appearance of rash is overall stable and she does not develop new symptoms, would recommend observance of the rash.  She will contact the office for any changes in symptoms.

## 2025-06-19 NOTE — TELEPHONE ENCOUNTER
Spoke with patients    He said she has no relief and condition has not improved at all, in pain   He said she added taking Benadryl

## 2025-06-27 ENCOUNTER — APPOINTMENT (OUTPATIENT)
Dept: PHARMACY | Facility: HOSPITAL | Age: 85
End: 2025-06-27
Payer: MEDICARE

## 2025-06-27 DIAGNOSIS — N39.41 URGE INCONTINENCE: ICD-10-CM

## 2025-06-27 NOTE — PROGRESS NOTES
"  Patient ID: Lakshmi Trinh is a 84 y.o. female who presents for Urge incontinence.    Referring Provider: Steph Calero   Pt was referred for Gemtesa cost assistance     Preferred Pharmacy:    [id number] - [place], [place] - [address] [facility]  [address] [facility] [address]  Phone: [phone] Fax: [phone]    [facility]  [address], [address]  [facility] [address]  Phone: [phone] Fax: [phone]      Copay assistance:   Do you have copays on your medications? Yes  Do you have trouble affording your current medications? Yes     Patient Assistance Screening (VAF)    Patient verbally reports monthly or yearly income which is less than 400% federal poverty level   Application for program has been submitted for the following medications:   Gemtesa   Patient has been informed that program team will be reaching out to them to discuss necessary documentation, instructed to answer phone/return voicemail.   Patient aware this process may take up to 6 weeks.   If approved medication must be filled through [facility] pharmacy and may be picked up or mailed to patient.       Subjective      Urinary Symptoms  Medications that may contribute to symptoms:   Bumetanide - discussed taking in the morning to avoid nighttime awakenings - which patient does   Jardiance   Any history of:   Narrow-angle glaucoma? Patient does have glaucoma, unsure of what type   Impaired gastric emptying? No   Urinary retention? No    If diabetes, blood sugar controlled? HbA1c 7.4%  Frequently of bowel movement? Every other day   Tobacco use? No   How many protective undergarments are you utilizing daily? 2 Pads daily compared to 5 pads before Gemtesa   She does mention occasional itching on the outside of vagina in past; denies any currently. She thinks due to \"not getting air\"/related to moisture from pads. Recommended Kt - she has not picked up yet. Also educated that Jardiance can increase risk of " "yeast infections/UTI. Instructed her that should she have any signs of infection to follow up with provider right away.   What medications have been tried/stopped?   Myrbetriq - didn't work   Current medication? Gemtesa 75 mg daily   When started? October 2024  Side effects? no  Improvement in symptoms? Yes       Cardiovascular Health  The ASCVD Risk score (Princess CASTRO, et al., 2019) failed to calculate for the following reasons:    The 2019 ASCVD risk score is only valid for ages 40 to 79    Risk score cannot be calculated because patient has a medical history suggesting prior/existing ASCVD    Lab Results   Component Value Date    CHOL 166 10/02/2024     Lab Results   Component Value Date    HDL 37.9 10/02/2024     Lab Results   Component Value Date    LDLCALC 79 10/02/2024     Lab Results   Component Value Date    TRIG 245 (H) 10/02/2024     No components found for: \"CHOLHDL\"        Blood Sugar Balance  Lab Results   Component Value Date    GLUCOSE 136 (H) 05/05/2025    HGBA1C 8.0 (H) 05/05/2025    HGBA1C 7.0 (H) 01/09/2025    HGBA1C 7.4 (H) 10/02/2024     No results found for: \"LEPTIN\", \"INSULFAST\", \"GLUF\"      Thyroid  Lab Results   Component Value Date    TSH 4.24 (H) 12/12/2022    FREET4 1.01 12/12/2022       Iron Status  No results found for: \"IRON\", \"TIBC\", \"FERRITIN\"     Kidney Function  Lab Results   Component Value Date    GFRF 33 (A) 09/14/2023    CREATININE 1.39 (H) 05/05/2025       Potassium  Lab Results   Component Value Date    K 4.7 05/05/2025        Vitamin D3  Lab Results   Component Value Date    VITD25 47 03/25/2024       Current Outpatient Medications on File Prior to Visit   Medication Sig Dispense Refill    allopurinol (Zyloprim) 300 mg tablet TAKE ONE TABLET (300 mg) BY MOUTH DAILY 90 tablet 3    bumetanide (Bumex) 0.5 mg tablet TAKE ONE TABLET BY MOUTH EVERY MORNING 90 tablet 3    calcitriol (Rocaltrol) 0.25 mcg capsule TAKE 2 CAPSULES BY MOUTH ONCE DAILY OVER THE WEEKEND AND 1 CAPSULE ONCE " DAILY DURING THE WEEK 90 capsule 3    cholecalciferol (Vitamin D-3) 50 MCG (2000 UT) tablet Take 1 tablet (50 mcg) by mouth once daily.      dexAMETHasone (Decadron) 2 mg tablet Take 1 tablet (2 mg) by mouth 2 times a day as needed.      empagliflozin (Jardiance) 25 mg tablet Take 1 tablet (25 mg) by mouth once daily. 90 tablet 3    hydrocortisone 2.5 % cream Apply topically 2 times a day as needed for irritation or rash. 453.6 g 0    latanoprost (Xalatan) 0.005 % ophthalmic solution Administer 1 drop into both eyes once daily at bedtime.      lisinopril 2.5 mg tablet Take 1 tablet (2.5 mg) by mouth once daily. 90 tablet 1    metFORMIN (Glucophage) 500 mg tablet TAKE ONE TABLET BY MOUTH TWO TIMES A  tablet 1    nitroglycerin (Nitrostat) 0.4 mg SL tablet Place 1 tablet (0.4 mg) under the tongue every 5 minutes if needed for chest pain. May repeat every 5 minutes for up to 3 doses. 30 tablet 0    simvastatin (Zocor) 40 mg tablet Take 1 tablet (40 mg) by mouth once daily at bedtime. 90 tablet 1    timolol (Timoptic) 0.5 % ophthalmic solution Administer 1 drop into both eyes 2 times a day.      timoloL maleate, PF, 0.5 % dropperette Administer 1 drop into both eyes twice a day.      vibegron 75 mg tablet Take 1 tablet (75 mg) by mouth once daily. 90 tablet 3    vitamins A,C,E-zinc-copper (PreserVision AREDS) 4,296 mcg-226 mg-90 mg capsule Take 1 tablet by mouth every 12 hours.       No current facility-administered medications on file prior to visit.        Medication and allergy reconciliation completed     Drug Interactions   No significant drug interactions identified    Assessment/Plan     Patient is experiencing urinary urgency and incontinence, which has been going on for around two years. She is s/p Botox injection, which she did not feel helped her bladder symptoms. She reports that since starting Gemtesa her symptoms have improved. Will continue Gemtesa through [facility] to continue improving/reducing her  urinary urgency and incontinence.   Has tried before Myrbetriq - didn't work   Patient has already been approved for  VAF for Jardiance and Gemtesa     Gemtesa   Discussed MOA: works by activating beta-3 adrenergic receptors in the bladder resulting in relaxation of the detrusor smooth muscle during the urine storage phase, thus increasing bladder capacity.  Provided education on administration and potential side effects including but not limited to hot flashes <2%, constipation/diarrhea <2%, dry mouth <2%, and headache <4%.   Gemtesa (vibegron) starts working almost immediately - within a few days of first taking it, with noticeable improvements in urinary urgency, frequency, and incontinence noted in clinical trials at 2 weeks which were reported as significant by 12 weeks.    LABS  Lab Results   Component Value Date    GFRF 33 (A) 09/14/2023     GFR 38 mL/min as of 5/5/2025    CONTINUE  Gemtesa 75 mg once daily    Follow-up: 10/10/2025 at 10:20 am      Time spent with pt: Total length of time 10 (minutes) of the encounter and more than 50% was spent counseling the patient.       Patient Assistance Program Approval:     We are pleased to inform you that your application for assistance has been approved.     This approval is valid through 11/4/2025 as long as the following criteria continue to be satisfied:     Your medication (Gemtesa) remains covered under your current insurance plan.   Your prescriber does not discontinue therapy.   You do not seek reimbursement from any other private or government-funded programs for the  medication.    Under this program, the pharmacy will first bill your insurance plan for your indemnified specified medication. The [employer] Assistance Fund will then offset your copay balance, so that your out-of pocket expense for your specialty medication will be $0.00.    Marion Yousif, PharmD       Continue all meds under the continuation of care with the referring provider and  clinical pharmacy team.    Verbal consent to manage patient's drug therapy was obtained from the patient and/or an individual authorized to act on behalf of a patient. They were informed they may decline to participate or withdraw from participation in pharmacy services at any time.

## 2025-07-14 ENCOUNTER — APPOINTMENT (OUTPATIENT)
Dept: PHARMACY | Facility: HOSPITAL | Age: 85
End: 2025-07-14
Payer: MEDICARE

## 2025-07-14 DIAGNOSIS — N18.32 TYPE 2 DIABETES MELLITUS WITH STAGE 3B CHRONIC KIDNEY DISEASE, WITHOUT LONG-TERM CURRENT USE OF INSULIN (MULTI): ICD-10-CM

## 2025-07-14 DIAGNOSIS — E11.22 TYPE 2 DIABETES MELLITUS WITH STAGE 3B CHRONIC KIDNEY DISEASE, WITHOUT LONG-TERM CURRENT USE OF INSULIN (MULTI): ICD-10-CM

## 2025-07-16 ASSESSMENT — ENCOUNTER SYMPTOMS: DIABETIC ASSOCIATED SYMPTOMS: 0

## 2025-07-16 NOTE — PROGRESS NOTES
[facility] Pharmacy Consult  Lakshmi Trinh is a 84 y.o. female was referred to Clinical Pharmacy Team for a Pharmacy consult.  The patient was referred for their No chief complaint on file..    Referring Provider: Misty Rincon MD    Subjective   Allergies   Allergen Reactions    Nsaids (Non-Steroidal Anti-Inflammatory Drug) Hives       [id number] - [place], [place] - [address] [facility]  [address] [facility] [address]  Phone: [phone] Fax: [phone]    [facility]  [address], [address]  [facility] [address]  Phone: [phone] Fax: [phone]      Diabetes  She presents for her follow-up diabetic visit. She has type 2 diabetes mellitus. Her disease course has been stable. There are no hypoglycemic associated symptoms. There are no diabetic associated symptoms. There are no hypoglycemic complications. Symptoms are stable. There are no diabetic complications. Risk factors for coronary artery disease include diabetes mellitus, dyslipidemia and hypertension. Current diabetic treatment includes oral agent (dual therapy). She is compliant with treatment all of the time. She is following a generally healthy diet. She rarely participates in exercise. An ACE inhibitor/angiotensin II receptor blocker is being taken.     Reported lost 5 lbs    Review of Systems    Objective     There were no vitals taken for this visit.     LAB  Lab Results   Component Value Date    BILITOT 0.6 10/02/2024    CALCIUM 9.7 05/05/2025    CO2 22 05/05/2025    CL 99 05/05/2025    CREATININE 1.39 (H) 05/05/2025    GLUCOSE 136 (H) 05/05/2025    ALKPHOS 108 10/02/2024    K 4.7 05/05/2025    PROT 6.6 10/02/2024     05/05/2025    AST 14 10/02/2024    ALT 13 10/02/2024    BUN 43 (H) 05/05/2025    ANIONGAP 18 (H) 05/05/2025    MG 1.99 12/12/2023    PHOS 4.3 06/05/2023    ALBUMIN 4.3 10/02/2024    LIPASE 53 12/12/2023    GFRF 33 (A) 09/14/2023     Lab Results   Component Value Date    TRIG 245 (H)  10/02/2024    CHOL 166 10/02/2024    LDLCALC 79 10/02/2024    HDL 37.9 10/02/2024     Lab Results   Component Value Date    HGBA1C 8.0 (H) 05/05/2025       Current Outpatient Medications on File Prior to Visit   Medication Sig Dispense Refill    allopurinol (Zyloprim) 300 mg tablet TAKE ONE TABLET (300 mg) BY MOUTH DAILY 90 tablet 3    bumetanide (Bumex) 0.5 mg tablet TAKE ONE TABLET BY MOUTH EVERY MORNING 90 tablet 3    calcitriol (Rocaltrol) 0.25 mcg capsule TAKE 2 CAPSULES BY MOUTH ONCE DAILY OVER THE WEEKEND AND 1 CAPSULE ONCE DAILY DURING THE WEEK 90 capsule 3    cholecalciferol (Vitamin D-3) 50 MCG (2000 UT) tablet Take 1 tablet (50 mcg) by mouth once daily.      dexAMETHasone (Decadron) 2 mg tablet Take 1 tablet (2 mg) by mouth 2 times a day as needed.      empagliflozin (Jardiance) 25 mg tablet Take 1 tablet (25 mg) by mouth once daily. 90 tablet 3    hydrocortisone 2.5 % cream Apply topically 2 times a day as needed for irritation or rash. 453.6 g 0    latanoprost (Xalatan) 0.005 % ophthalmic solution Administer 1 drop into both eyes once daily at bedtime.      lisinopril 2.5 mg tablet Take 1 tablet (2.5 mg) by mouth once daily. 90 tablet 1    metFORMIN (Glucophage) 500 mg tablet TAKE ONE TABLET BY MOUTH TWO TIMES A  tablet 1    nitroglycerin (Nitrostat) 0.4 mg SL tablet Place 1 tablet (0.4 mg) under the tongue every 5 minutes if needed for chest pain. May repeat every 5 minutes for up to 3 doses. 30 tablet 0    simvastatin (Zocor) 40 mg tablet Take 1 tablet (40 mg) by mouth once daily at bedtime. 90 tablet 1    timolol (Timoptic) 0.5 % ophthalmic solution Administer 1 drop into both eyes 2 times a day.      timoloL maleate, PF, 0.5 % dropperette Administer 1 drop into both eyes twice a day.      vibegron 75 mg tablet Take 1 tablet (75 mg) by mouth once daily. 90 tablet 3    vitamins A,C,E-zinc-copper (PreserVision AREDS) 4,296 mcg-226 mg-90 mg capsule Take 1 tablet by mouth every 12 hours.       No  current facility-administered medications on file prior to visit.        HISTORICAL PHARMACOTHERAPY  -none    DRUG INTERACTIONS  - none    SECONDARY PREVENTION  - Statin? yes  - ACE-I/ARB? yes    Assessment/Plan   Problem List Items Addressed This Visit       Type 2 diabetes mellitus with stage 3b chronic kidney disease, without long-term current use of insulin (Multi)   Patient's A1C did go up since last visit. PCP is waiting to get an updated A1C next month before starting any new medication. Will follow up in 4 weeks to review updated A1C. If out of goal, may consider addition of a new medication at that time.    PLAN:  Follow up in 4 weeks    Continue all meds under the continuation of care with the referring provider and clinical pharmacy team.    Terri Alvarado PharmD     Verbal consent to manage patient's drug therapy was obtained from [the patient and/or an individual authorized to act on behalf of a patient]. They were informed they may decline to participate or withdraw from participation in pharmacy services at any time.

## 2025-07-30 DIAGNOSIS — E11.22 TYPE 2 DIABETES MELLITUS WITH STAGE 3 CHRONIC KIDNEY DISEASE, WITHOUT LONG-TERM CURRENT USE OF INSULIN, UNSPECIFIED WHETHER STAGE 3A OR 3B CKD (MULTI): ICD-10-CM

## 2025-07-30 DIAGNOSIS — N18.30 TYPE 2 DIABETES MELLITUS WITH STAGE 3 CHRONIC KIDNEY DISEASE, WITHOUT LONG-TERM CURRENT USE OF INSULIN, UNSPECIFIED WHETHER STAGE 3A OR 3B CKD (MULTI): ICD-10-CM

## 2025-07-31 DIAGNOSIS — N18.31 STAGE 3A CHRONIC KIDNEY DISEASE (MULTI): ICD-10-CM

## 2025-07-31 RX ORDER — BUMETANIDE 0.5 MG/1
0.5 TABLET ORAL
Qty: 90 TABLET | Refills: 0 | Status: SHIPPED | OUTPATIENT
Start: 2025-07-31

## 2025-08-13 ENCOUNTER — APPOINTMENT (OUTPATIENT)
Dept: PRIMARY CARE | Facility: CLINIC | Age: 85
End: 2025-08-13
Payer: MEDICARE

## 2025-08-14 ENCOUNTER — APPOINTMENT (OUTPATIENT)
Dept: PHARMACY | Facility: HOSPITAL | Age: 85
End: 2025-08-14
Payer: MEDICARE

## 2025-08-27 ENCOUNTER — RESULTS FOLLOW-UP (OUTPATIENT)
Dept: PRIMARY CARE | Facility: CLINIC | Age: 85
End: 2025-08-27
Payer: MEDICARE

## 2025-08-27 LAB
ANION GAP SERPL CALCULATED.4IONS-SCNC: 11 MMOL/L (CALC) (ref 7–17)
BUN SERPL-MCNC: 32 MG/DL (ref 7–25)
BUN/CREAT SERPL: 27 (CALC) (ref 6–22)
CALCIUM SERPL-MCNC: 9.4 MG/DL (ref 8.6–10.4)
CHLORIDE SERPL-SCNC: 103 MMOL/L (ref 98–110)
CO2 SERPL-SCNC: 25 MMOL/L (ref 20–32)
CREAT SERPL-MCNC: 1.18 MG/DL (ref 0.6–0.95)
EGFRCR SERPLBLD CKD-EPI 2021: 46 ML/MIN/1.73M2
EST. AVERAGE GLUCOSE BLD GHB EST-MCNC: 171 MG/DL
EST. AVERAGE GLUCOSE BLD GHB EST-SCNC: 9.5 MMOL/L
GLUCOSE SERPL-MCNC: 209 MG/DL (ref 65–99)
HBA1C MFR BLD: 7.6 %
POTASSIUM SERPL-SCNC: 4.2 MMOL/L (ref 3.5–5.3)
SODIUM SERPL-SCNC: 139 MMOL/L (ref 135–146)

## 2025-08-28 ENCOUNTER — APPOINTMENT (OUTPATIENT)
Dept: PRIMARY CARE | Facility: CLINIC | Age: 85
End: 2025-08-28
Payer: MEDICARE

## 2025-08-28 VITALS
HEART RATE: 60 BPM | DIASTOLIC BLOOD PRESSURE: 79 MMHG | WEIGHT: 189 LBS | HEIGHT: 63 IN | BODY MASS INDEX: 33.49 KG/M2 | TEMPERATURE: 96.8 F | SYSTOLIC BLOOD PRESSURE: 120 MMHG

## 2025-08-28 DIAGNOSIS — E78.2 MIXED HYPERLIPIDEMIA: ICD-10-CM

## 2025-08-28 DIAGNOSIS — N76.2 ACUTE VULVITIS: ICD-10-CM

## 2025-08-28 DIAGNOSIS — E11.22 TYPE 2 DIABETES MELLITUS WITH STAGE 3B CHRONIC KIDNEY DISEASE, WITHOUT LONG-TERM CURRENT USE OF INSULIN (MULTI): Primary | ICD-10-CM

## 2025-08-28 DIAGNOSIS — E11.21 TYPE 2 DIABETES MELLITUS WITH DIABETIC NEPHROPATHY, WITHOUT LONG-TERM CURRENT USE OF INSULIN: ICD-10-CM

## 2025-08-28 DIAGNOSIS — E11.65 TYPE 2 DIABETES MELLITUS WITH HYPERGLYCEMIA, WITHOUT LONG-TERM CURRENT USE OF INSULIN: ICD-10-CM

## 2025-08-28 DIAGNOSIS — N18.32 TYPE 2 DIABETES MELLITUS WITH STAGE 3B CHRONIC KIDNEY DISEASE, WITHOUT LONG-TERM CURRENT USE OF INSULIN (MULTI): Primary | ICD-10-CM

## 2025-08-28 DIAGNOSIS — I10 ESSENTIAL HYPERTENSION: ICD-10-CM

## 2025-08-28 PROCEDURE — 1159F MED LIST DOCD IN RCRD: CPT | Performed by: FAMILY MEDICINE

## 2025-08-28 PROCEDURE — 3074F SYST BP LT 130 MM HG: CPT | Performed by: FAMILY MEDICINE

## 2025-08-28 PROCEDURE — 1160F RVW MEDS BY RX/DR IN RCRD: CPT | Performed by: FAMILY MEDICINE

## 2025-08-28 PROCEDURE — 3078F DIAST BP <80 MM HG: CPT | Performed by: FAMILY MEDICINE

## 2025-08-28 PROCEDURE — 1036F TOBACCO NON-USER: CPT | Performed by: FAMILY MEDICINE

## 2025-08-28 PROCEDURE — G2211 COMPLEX E/M VISIT ADD ON: HCPCS | Performed by: FAMILY MEDICINE

## 2025-08-28 PROCEDURE — 99214 OFFICE O/P EST MOD 30 MIN: CPT | Performed by: FAMILY MEDICINE

## 2025-08-28 RX ORDER — LISINOPRIL 2.5 MG/1
2.5 TABLET ORAL DAILY
Qty: 90 TABLET | Refills: 1 | Status: SHIPPED | OUTPATIENT
Start: 2025-08-28

## 2025-08-28 RX ORDER — NYSTATIN 100000 U/G
CREAM TOPICAL 2 TIMES DAILY
Qty: 30 G | Refills: 0 | Status: SHIPPED | OUTPATIENT
Start: 2025-08-28 | End: 2025-09-04

## 2025-08-28 RX ORDER — TIRZEPATIDE 2.5 MG/.5ML
2.5 INJECTION, SOLUTION SUBCUTANEOUS WEEKLY
Start: 2025-08-28

## 2025-08-28 RX ORDER — BROMFENAC SODIUM 0.7 MG/ML
SOLUTION/ DROPS OPHTHALMIC
COMMUNITY
Start: 2025-07-24

## 2025-08-28 RX ORDER — PREDNISOLONE ACETATE 10 MG/ML
SUSPENSION/ DROPS OPHTHALMIC
COMMUNITY
Start: 2025-08-26

## 2025-08-28 RX ORDER — MOXIFLOXACIN 5 MG/ML
SOLUTION/ DROPS OPHTHALMIC
COMMUNITY
Start: 2025-07-22

## 2025-08-28 ASSESSMENT — ENCOUNTER SYMPTOMS
CONSTIPATION: 0
SHORTNESS OF BREATH: 0
DIZZINESS: 0
FATIGUE: 0
PALPITATIONS: 0
HEADACHES: 0
MYALGIAS: 1
COUGH: 0
DIFFICULTY URINATING: 0
CHEST TIGHTNESS: 0
ACTIVITY CHANGE: 1
UNEXPECTED WEIGHT CHANGE: 0
DIARRHEA: 0
ABDOMINAL PAIN: 0

## 2025-08-29 ENCOUNTER — APPOINTMENT (OUTPATIENT)
Dept: PHARMACY | Facility: HOSPITAL | Age: 85
End: 2025-08-29
Payer: MEDICARE

## 2025-08-29 DIAGNOSIS — N18.32 TYPE 2 DIABETES MELLITUS WITH STAGE 3B CHRONIC KIDNEY DISEASE, WITHOUT LONG-TERM CURRENT USE OF INSULIN (MULTI): Primary | ICD-10-CM

## 2025-08-29 DIAGNOSIS — E11.22 TYPE 2 DIABETES MELLITUS WITH STAGE 3B CHRONIC KIDNEY DISEASE, WITHOUT LONG-TERM CURRENT USE OF INSULIN (MULTI): Primary | ICD-10-CM

## 2025-09-26 ENCOUNTER — APPOINTMENT (OUTPATIENT)
Dept: PHARMACY | Facility: HOSPITAL | Age: 85
End: 2025-09-26
Payer: MEDICARE

## 2025-10-06 ENCOUNTER — APPOINTMENT (OUTPATIENT)
Dept: INTEGRATIVE MEDICINE | Facility: CLINIC | Age: 85
End: 2025-10-06
Payer: MEDICARE

## 2025-10-10 ENCOUNTER — APPOINTMENT (OUTPATIENT)
Dept: PHARMACY | Facility: HOSPITAL | Age: 85
End: 2025-10-10
Payer: MEDICARE

## 2025-10-29 ENCOUNTER — APPOINTMENT (OUTPATIENT)
Dept: NEPHROLOGY | Facility: CLINIC | Age: 85
End: 2025-10-29
Payer: MEDICARE

## 2025-11-05 ENCOUNTER — APPOINTMENT (OUTPATIENT)
Dept: NEPHROLOGY | Facility: CLINIC | Age: 85
End: 2025-11-05
Payer: MEDICARE

## 2025-12-01 ENCOUNTER — APPOINTMENT (OUTPATIENT)
Dept: PRIMARY CARE | Facility: CLINIC | Age: 85
End: 2025-12-01
Payer: MEDICARE

## 2026-06-17 ENCOUNTER — APPOINTMENT (OUTPATIENT)
Dept: UROLOGY | Facility: CLINIC | Age: 86
End: 2026-06-17
Payer: MEDICARE